# Patient Record
Sex: FEMALE | URBAN - METROPOLITAN AREA
[De-identification: names, ages, dates, MRNs, and addresses within clinical notes are randomized per-mention and may not be internally consistent; named-entity substitution may affect disease eponyms.]

---

## 2017-02-02 ENCOUNTER — TRANSFERRED RECORDS (OUTPATIENT)
Dept: HEALTH INFORMATION MANAGEMENT | Facility: CLINIC | Age: 37
End: 2017-02-02

## 2017-02-03 ENCOUNTER — MEDICAL CORRESPONDENCE (OUTPATIENT)
Dept: HEALTH INFORMATION MANAGEMENT | Facility: CLINIC | Age: 37
End: 2017-02-03

## 2017-02-05 ENCOUNTER — HOSPITAL ENCOUNTER (EMERGENCY)
Facility: CLINIC | Age: 37
Discharge: HOME | End: 2017-02-05
Attending: EMERGENCY MEDICINE | Admitting: EMERGENCY MEDICINE
Payer: COMMERCIAL

## 2017-02-05 VITALS
WEIGHT: 130.6 LBS | HEART RATE: 74 BPM | SYSTOLIC BLOOD PRESSURE: 130 MMHG | RESPIRATION RATE: 16 BRPM | OXYGEN SATURATION: 100 % | DIASTOLIC BLOOD PRESSURE: 74 MMHG | TEMPERATURE: 98.6 F

## 2017-02-05 LAB
ALBUMIN UR-MCNC: NEGATIVE MG/DL
APPEARANCE UR: ABNORMAL
BACTERIA #/AREA URNS HPF: ABNORMAL /HPF
BILIRUB UR QL STRIP: NEGATIVE
COLOR UR AUTO: YELLOW
GLUCOSE UR STRIP-MCNC: NEGATIVE MG/DL
HGB UR QL STRIP: ABNORMAL
KETONES UR STRIP-MCNC: NEGATIVE MG/DL
LEUKOCYTE ESTERASE UR QL STRIP: ABNORMAL
MUCOUS THREADS #/AREA URNS LPF: PRESENT /LPF
NITRATE UR QL: NEGATIVE
PH UR STRIP: 5 PH (ref 5–7)
RBC #/AREA URNS AUTO: 10 /HPF (ref 0–2)
SP GR UR STRIP: 1.01 (ref 1–1.03)
SQUAMOUS #/AREA URNS AUTO: 7 /HPF (ref 0–1)
URN SPEC COLLECT METH UR: ABNORMAL
UROBILINOGEN UR STRIP-MCNC: NORMAL MG/DL (ref 0–2)
WBC #/AREA URNS AUTO: 30 /HPF (ref 0–2)

## 2017-02-05 PROCEDURE — 99284 EMERGENCY DEPT VISIT MOD MDM: CPT | Mod: Z6 | Performed by: EMERGENCY MEDICINE

## 2017-02-05 PROCEDURE — 99283 EMERGENCY DEPT VISIT LOW MDM: CPT | Performed by: EMERGENCY MEDICINE

## 2017-02-05 PROCEDURE — 87086 URINE CULTURE/COLONY COUNT: CPT | Performed by: EMERGENCY MEDICINE

## 2017-02-05 PROCEDURE — 81001 URINALYSIS AUTO W/SCOPE: CPT | Performed by: EMERGENCY MEDICINE

## 2017-02-05 NOTE — ED PROVIDER NOTES
History     Chief Complaint   Patient presents with     Postpartum Complications     abdominal pain, remains of placenta and bad smell     HPI  Franny Jordin Muro is a 36 year old female, who is about 6 weeks postpartum from a normal spontaneous vaginal delivery in late December, presenting to the Emergency Department with persistent vaginal bleeding, abdominal discomfort and some increased foul smelling discharge. The patient was seen at her routine 6 week follow up postpartum visit and was having these symptoms and her Ob/Gyn ordered a transvaginal ultrasound which did show a small portion of retained placenta. She was called and notified of these findings and given return to Emergency Department precautions but was scheduled to visit with the Ob/Gyn again to set her up for outpatient D&C. She notes that since that appointment a few days ago she has had mildly increased pain, has been changing her pads 1-2 times per day due to bleeding and thinks there is an increase in foul smelling discharge. She denies any fevers or chills. No nausea or vomiting. No dysuria. She is otherwise feeling okay.     I have reviewed the Medications, Allergies, Past Medical and Surgical History, and Social History in the Epic system.    History reviewed. No pertinent past medical history.    History reviewed. No pertinent past surgical history.    Family History   Problem Relation Age of Onset     Hyperlipidemia Mother      CANCER Maternal Grandfather      HEART DISEASE Mother      Asthma Son      DIABETES Paternal Aunt        Social History   Substance Use Topics     Smoking status: Never Smoker      Smokeless tobacco: Not on file     Alcohol Use: No     No current facility-administered medications for this encounter.     Current Outpatient Prescriptions   Medication     VITAMIN D, CHOLECALCIFEROL, PO     Prenatal Vit-Fe Fumarate-FA (PRENATAL MULTIVITAMIN  PLUS IRON) 27-0.8 MG TABS per tablet     ibuprofen (ADVIL/MOTRIN) 400 MG  tablet     senna-docusate (SENOKOT-S;PERICOLACE) 8.6-50 MG per tablet     norethindrone (MICRONOR) 0.35 MG per tablet      No Known Allergies    Review of Systems   All other systems negative except as noted in the HPI.    Physical Exam   BP: (!) 126/92 mmHg  Pulse: 74  Heart Rate: 69  Temp: 98.4  F (36.9  C)  Resp: 16  Weight: 59.24 kg (130 lb 9.6 oz)  SpO2: 100 %  Physical Exam   Gen: NAD, sitting on stretcher, conversant and pleasant, non-toxic appearing  HEENT: NCAT, PERRL, EOMI, MMM  Neck: trachea midline, supple with FROM  Cardio: normal rate regular rhythm, no R/M/G, normally perfused and warm  Pulm: steady, non-labored respirations, normal WOB, CTAB, no w/r/r  Abd: soft, minimally tender over suprapubic area, no organomegaly, no CVA tenderness  Pelvic: normal vaginal vault, minimal discharge, no CMT  Ext: normal peripheral pulses, no edema, neurovascularly intact distally.  Skin: no rashes or signs of trauma  Neuro: no focal deficits, 5/5 strength in all ext      ED Course     Procedures               Labs Ordered and Resulted from Time of ED Arrival Up to the Time of Departure from the ED   ROUTINE UA WITH MICROSCOPIC - Abnormal; Notable for the following:     Blood Urine Small (*)     Leukocyte Esterase Urine Large (*)     WBC Urine 30 (*)     RBC Urine 10 (*)     Bacteria Urine Few (*)     Squamous Epithelial /HPF Urine 7 (*)     Mucous Urine Present (*)     All other components within normal limits       Assessments & Plan (with Medical Decision Making)   This is a 36 year old female, 6 weeks postpartum, with retained placenta on ultrasound a few days ago, presenting with increased abdominal pain, discharge and vaginal bleeding, concerning for endometritis. Her exam is very reassuring, however, she has normal vital signs, a soft abdomen with minimal tenderness and her pelvic exam is unremarkable with minimal vaginal bleeding, no discharge and a nontender cervix. After history and physical, an Ob/Gyn  consultation was obtained and they were able to evaluate the patient and do a pelvic exam. They were very reassured by her exam and findings and would like to manage this as an outpatient. They noted that patient hadn t been taking any pain medications and encouraged her to take ibuprofen or Tylenol for the discomfort. They did not appreciate any concerning vaginal bleeding or discharge that they would start antibiotics for and so they recommended that she follow up on Monday with either her own Ob/Gyn or with our Ob/Gyn team to discuss D&C in the coming days. UA likely contaminated given current clinical context, and pt has no dysuria or fevers, low suspicion for UTI so will send for culture. Patient is agreement with plan. Discharged to home at this time in good condition with close follow up with Ob/Gyn for D&C in the next few days.     I have reviewed the nursing notes.    I have reviewed the findings, diagnosis, plan and need for follow up with the patient.    Discharge Medication List as of 2/5/2017 12:10 PM          Final diagnoses:   Retained portions of placenta   I, Geeta Perkins, am serving as a trained medical scribe to document services personally performed by Drew Browning MD, based on the provider's statements to me.      IDrew MD, was physically present and have reviewed and verified the accuracy of this note documented by Geeta Perkins.       2/5/2017   [facility], [place], EMERGENCY DEPARTMENT      Drew Browning MD  02/05/17 3121

## 2017-02-05 NOTE — ED AVS SNAPSHOT
[facility], Emergency Department    [address]    [place] [address]    Phone:  [phone]    Fax:  [phone]                                       Franny Muro   MRN: [id number]    Department:  [facility], Emergency Department   Date of Visit:  2/5/2017           Patient Information     Date Of Birth          1980        Your diagnoses for this visit were:     Retained portions of placenta        You were seen by Drew Browning MD.      Follow-up Information     Follow up with [facility], Emergency Department.    Specialty:  EMERGENCY MEDICINE    Why:  If symptoms worsen    Contact information:    [address]  [place] [address] [phone]    Additional information:    The [facility] is located in the [facility].  is easily accessible from virtually any point in the Health system area, via [address]        Discharge Instructions         Please make an appointment to follow up with OB/Gyn--[facility] (phone: [phone]) as soon as possible.          24 Hour Appointment Hotline       To make an appointment at any [facility] clinic, call [phone] ([phone]). If you don't have a family doctor or clinic, we will help you find one. [facility] clinics are conveniently located to serve the needs of you and your family.             Review of your medicines      Our records show that you are taking the medicines listed below. If these are incorrect, please call your family doctor or clinic.        Dose / Directions Last dose taken    ibuprofen 400 MG tablet   Commonly known as:  ADVIL/MOTRIN   Dose:  400-800 mg   Quantity:  120 tablet        Take 1-2 tablets (400-800 mg) by mouth every 6 hours as needed for other (cramping)   Refills:  0        norethindrone 0.35 MG per tablet   Commonly known as:  MICRONOR   Dose:  1 tablet   Quantity:  84 tablet        Take 1 tablet (0.35 mg) by mouth daily   Refills:  4      "   prenatal multivitamin  plus iron 27-0.8 MG Tabs per tablet   Dose:  1 tablet        Take 1 tablet by mouth daily   Refills:  0        senna-docusate 8.6-50 MG per tablet   Commonly known as:  SENOKOT-S;PERICOLACE   Dose:  1-2 tablet   Quantity:  100 tablet        Take 1-2 tablets by mouth 2 times daily as needed for constipation   Refills:  0        VITAMIN D (CHOLECALCIFEROL) PO        Take by mouth daily   Refills:  0                Procedures and tests performed during your visit     UA with Microscopic      Orders Needing Specimen Collection     None      Pending Results     Date and Time Order Name Status Description    2017 1103 UA with Microscopic In process             Pending Culture Results     Date and Time Order Name Status Description    2017 1103 UA with Microscopic In process             Thank you for choosing [facility]       Thank you for choosing [facility] for your care. Our goal is always to provide you with excellent care. Hearing back from our patients is one way we can continue to improve our services. Please take a few minutes to complete the written survey that you may receive in the mail after you visit with us. Thank you!        [employer] Information     [employer] lets you send messages to your doctor, view your test results, renew your prescriptions, schedule appointments and more. To sign up, go to [email or web address] . Click on \"Log in\" on the left side of the screen, which will take you to the Welcome page. Then click on \"Sign up Now\" on the right side of the page.     You will be asked to enter the access code listed below, as well as some personal information. Please follow the directions to create your username and password.     Your access code is: [email or web address]  Expires: 3/28/2017  9:57 AM     Your access code will  in 90 days. If you need help or a new code, please call your [facility] clinic or [phone].        Care EveryWhere ID     This is your Care EveryWhere " ID. This could be used by other organizations to access your [facility] medical records  [id number]        After Visit Summary       This is your record. Keep this with you and show to your community pharmacist(s) and doctor(s) at your next visit.

## 2017-02-05 NOTE — ED AVS SNAPSHOT
[facility], [place], Emergency Department    [address] [place] [address]    Phone:  [phone]    Fax:  [phone]                                       Franny Muro   MRN: [id number]    Department:  [facility], Emergency Department   Date of Visit:  2/5/2017           After Visit Summary Signature Page     I have received my discharge instructions, and my questions have been answered. I have discussed any challenges I see with this plan with the nurse or doctor.    ..........................................................................................................................................  Patient/Patient Representative Signature      ..........................................................................................................................................  Patient Representative Print Name and Relationship to Patient    ..................................................               ................................................  Date                                            Time    ..........................................................................................................................................  Reviewed by Signature/Title    ...................................................              ..............................................  Date                                                            Time

## 2017-02-05 NOTE — ED NOTES
Pt has had small amount of bleeding changing her pad one to two times daily since 12/26/2016 when she delivered a baby girl. This past Tuesday Franny went in for her 6 week post partum checkup, an ultrasound was performed and she was told that there was some retained placenta.  According to the patient, the Dr. Office told her they would call her for follow-up on Monday or Tuesday however, she has had increasing pain and foul smelling vaginal drainage.

## 2017-02-05 NOTE — CONSULTS
ED Consult Note - Ob/Gyn  Franny Muro   1980  MRN [id number]    CC: retained POCs    HPI: Franny Muro is a 36 year old  who underwent  on 16 following spontaneous labor. She had a first degree laceration which was repaired. Her delivery was complicated by retained placenta requiring manual sweep.   She presented to her OB provider at [facility] for follow up on 1/10/17 for vaginal odor and was found to have a negative wet prep and a UA concerning for UTI, for which she was treated with Macrobid. She then saw her provider on 17 for postpartum visit. She reports vaginal bleeding for the past two weeks causing her to change a pad 2-3 times/day. She also endorses intermittent right sided tenderness and cramping for the pas tweeks. She had an ultrasound performed demonstrating retained placenta per the notes. However, we are unable to view the images or ultrasound report through Care Everywhere today.     The patient presented to the ED this morning for worsening pain, foul smelling vaginal discharge and bleeding. She has been bleeding requiring 1-2 pads per day. She also reports foul smelling vaginal discharge but has not noticed any purulent green or yellow discharge and denies fevers or chills at home. She has cramping in her lower abdomen and describes it as warmth. She also endorses dizziness at home. Has not tried any medications at home for cramps. She denies nausea or vomiting and had a large glass of milk this morning at 8:30. Reports no issues with urination or bowel movements. She is breastfeeding and supplementing.     10 point ROS negative other than listed above.    PMH:  None    PSH:  None        No current facility-administered medications on file prior to encounter.  Current Outpatient Prescriptions on File Prior to Encounter:  VITAMIN D, CHOLECALCIFEROL, PO Take by mouth daily   Prenatal Vit-Fe Fumarate-FA (PRENATAL MULTIVITAMIN  PLUS IRON)  "27-0.8 MG TABS per tablet Take 1 tablet by mouth daily   ibuprofen (ADVIL/MOTRIN) 400 MG tablet Take 1-2 tablets (400-800 mg) by mouth every 6 hours as needed for other (cramping)   senna-docusate (SENOKOT-S;PERICOLACE) 8.6-50 MG per tablet Take 1-2 tablets by mouth 2 times daily as needed for constipation   norethindrone (MICRONOR) 0.35 MG per tablet Take 1 tablet (0.35 mg) by mouth daily         No Known Allergies     Social History     Social History     Marital Status:      Spouse Name: N/A     Number of Children: N/A     Years of Education: N/A     Occupational History     Not on file.     Social History Main Topics     Smoking status: Never Smoker      Smokeless tobacco: Not on file     Alcohol Use: No     Drug Use: No     Sexual Activity: Not on file     Other Topics Concern     Not on file     Social History Narrative        Objective:  Temp: 98.6  F (37  C) Temp src: Oral BP: 130/74 mmHg Pulse: 74 Heart Rate: 69 Resp: 16 SpO2: 100 % O2 Device: None (Room air)      Gen: NAD, sitting comfortably, pleasant  Heart: RRR  Lungs: CTAB  Abd: soft, nondistended, minimally tender to palpation in suprapubic area and RLQ   : external genitalia within normal limits.  Speculum exam reveals normal, closed, multiparous cervix with minimal thin yellow/white discharge. No cervical lesions or masses. No bleeding. Bimanual exam reveals no cervical motion tenderness, minimal tenderness in right adnexa. No fundal tenderness. Uterus small, mobile and anteverted.     Labs/Imaging:  Ultrasound at [facility] 2/3/17: \"official US report confirms retained placenta.\"    O positive    Assessment/Plan: Franny Muro is a 36 year old  who is approximately 6 weeks postpartum s/p  and manual sweep for retained placenta, now with retained products of conception. No evidence of hemodynamic instability, hemorrhage or infection. Patient stable for outpatient management.   - We discussed that patient has known " retained products of conception (although unable to view ultrasound report or images). She was already referred to GYN for retained POCs but has not yet been able to make an appointment to schedule a D&C. She states that she came to the ED because she was worried about it. She has no evidence of hemorrhage or acute infection indicating emergent dilation and curettage today. However, she should follow up in clinic within the next 1-2 days for preoperative visit to get D&C scheduled. Discussed that it would likely be under ultrasound guidance to ensure evacuation of all tissue within uterus.   - Patient to follow up in GYN clinic Monday or Tuesday this week. We will be able to obtain ultrasound records and confirm retained POCs and get her scheduled for D&C. Discussed bleeding and infectious precautions and when to call or come back to the ED. Patient and family understand and are in agreement with the plan.     Patient staffed with Dr. Estrada.    Emily Chapman MD  OB/GYN PGY3  02/05/2017

## 2017-02-05 NOTE — DISCHARGE INSTRUCTIONS
Please make an appointment to follow up with OB/Gyn--[facility] (phone: [phone]) as soon as possible.

## 2017-02-06 LAB
BACTERIA SPEC CULT: NORMAL
Lab: NORMAL
MICRO REPORT STATUS: NORMAL
SPECIMEN SOURCE: NORMAL

## 2017-02-07 ENCOUNTER — OFFICE VISIT (OUTPATIENT)
Dept: OBGYN | Facility: CLINIC | Age: 37
End: 2017-02-07
Attending: OBSTETRICS & GYNECOLOGY
Payer: COMMERCIAL

## 2017-02-07 VITALS — WEIGHT: 130.7 LBS | SYSTOLIC BLOOD PRESSURE: 112 MMHG | DIASTOLIC BLOOD PRESSURE: 72 MMHG | HEART RATE: 68 BPM

## 2017-02-07 PROCEDURE — 88305 TISSUE EXAM BY PATHOLOGIST: CPT | Performed by: OBSTETRICS & GYNECOLOGY

## 2017-02-07 PROCEDURE — 99212 OFFICE O/P EST SF 10 MIN: CPT | Mod: ZF

## 2017-02-07 PROCEDURE — 59160 D & C AFTER DELIVERY: CPT

## 2017-02-07 RX ORDER — DOXYCYCLINE 100 MG/1
200 CAPSULE ORAL ONCE
Qty: 2 CAPSULE | Refills: 0 | Status: SHIPPED | OUTPATIENT
Start: 2017-02-07 | End: 2017-02-07

## 2017-02-07 ASSESSMENT — PATIENT HEALTH QUESTIONNAIRE - PHQ9: 5. POOR APPETITE OR OVEREATING: NOT AT ALL

## 2017-02-07 ASSESSMENT — ANXIETY QUESTIONNAIRES
5. BEING SO RESTLESS THAT IT IS HARD TO SIT STILL: NOT AT ALL
7. FEELING AFRAID AS IF SOMETHING AWFUL MIGHT HAPPEN: NOT AT ALL
1. FEELING NERVOUS, ANXIOUS, OR ON EDGE: NOT AT ALL
6. BECOMING EASILY ANNOYED OR IRRITABLE: NOT AT ALL
3. WORRYING TOO MUCH ABOUT DIFFERENT THINGS: NOT AT ALL
GAD7 TOTAL SCORE: 0
2. NOT BEING ABLE TO STOP OR CONTROL WORRYING: NOT AT ALL

## 2017-02-07 NOTE — NURSING NOTE
Patient status post D&C procedure, complains of right sided lower abdominal pain and this is worsened when she tries to move her right leg. Vaginal bleeding minimal. Blood pressure 130/84 pulse 58. Ibuprofen 800 mg Po given to patient at approximately 1:00 by Dr. Mccormack along with 975 mg Tylenol at 1:20. Patient's  here to offer support. Tricia ULLOA

## 2017-02-07 NOTE — Clinical Note
"2017       RE: Franny Muro  [address]  [place] [address]     Dear Colleague,    Thank you for referring your patient, Franny Muro, to the [facility] at [facility]. Please see a copy of my visit note below.    PROCEDURE NOTE    37 yo  s/p  16 w/ retained placenta requiring manual extraction. Has been bleeding since delivery daily. Not heavy. No fevers.  Had us/ showing likely retained POC w/ 2 cm globular area at fundus.     Pt here for recs.      We discussed options for OR d and c vs office d and c w/ local anesthetic. Pt very tearful when she thought she may not get to have her procedure today. Highly desires treatment today.     Consent obtained w/ help of .      Suction d and c    Time Out - \"Pause for the Cause\"  Just before the procedure begins, through verbal and active participation of team members, verify:    Initials   Patient Name    smd   Patient Date of Birth smd   Procedure to be performed  smd   Site, laterality, level or multiples, noting patient position   smd   Relevant images or diagnostics available/displayed   smd   Implants, special equipment or special requirements        smd     Consent:  Risks, benefits of treatment, and no treatment were discussed.  Patient's questions were elicited and answered.  and Written consent signed and scanned into medical record.    Preoperative Diagnosis: retained POC  Postoperative Diagnosis:  Same    Surgeon: Dr Mccormack  Assist: Dr Solis    Skin Preparation:  Betadine  Anesthesia: 20 mL 1% lidocaine without EPI   Technique:  Using sterile technique, cervix was anesthetized.  The block was placed.  Tenaculum was applied at 12 o'clock. Tthe cervix was dilated to 21 [place].  A manual suction curette was used for removal of retained tissue.   Two passes were taken for small amount of tissue.  All instruments were removed. " Hemostasis was assured.   EBL:  10 mL  Complications:  none  Total Time:  20 min  Pathology:  Retained placenta  Tolerance of Procedure: Patient did tolerate the procedure well.  F/u: u/s here in 2 weeks and an rx for Doxycycline 200 Mg x 1.     Nuha Solis MD, FACOG  [facility] Staff  OB/GYN    2/7/2017  12:44 PM

## 2017-02-07 NOTE — PATIENT INSTRUCTIONS
Miscarriage Management with a D&C Aftercare Instructions    Today you had a dilation and curettage (D&C).  Like many other surgical procedures, a D&C is very safe but has small risks of side effects and complications. Although complications are rare, it is important that you know what to expect and what to do. Please keep this instruction sheet so that you may use it as a reference.    If you have any problems or questions, you can contact a doctor in our group at any time at [phone]. If you cannot reach a doctor and it is an emergency, you can call the [facility] ([facility]) Emergency Department at [phone].    WHAT TO EXPECT:    Bleeding:  Excessive bleeding is very uncommon. The normal amount of bleeding will vary from woman to woman. Some may have very little bleeding or no bleeding at all. Most commonly, women begin bleeding the day of procedure and may bleed for about one week. Some women can have spotting for as long as 2-4 weeks. It is normal to pass small clots and sometimes the bleeding may seem to increase when you get up suddenly or go to the toilet.     Cramping:  You will probably experience cramping for a few days that is similar to cramping with a menstrual period. Cramping is often caused by the uterus shrinking back to a normal size.  You should be able to obtain relief by taking ibuprofen (Motrin or Advil); take 600 mg (3 of the over-the-counter tablets) every 6 hours as needed for pain.  You can also use a heating pad or hot water bottle if necessary. You may use 2 Extra Strength Tylenol every 8 hours as needed if you are allergic to ibuprofen or aspirin, or in addition to ibuprofen if your pain is not relieved by ibuprofen alone. If the cramping is severe or prolonged, and you are not getting relief from any of these methods, please call the office.    Coping after your procedure:  Women have many different feelings and emotions after a miscarriage. It  is not unusual for some women to feel  down.   If you feel your emotions are not what they should be and/or need additional emotional help after your miscarriage, please contact our office or your primary doctor.    Pad/Tampon Use:  Pads should be used for the first few days after your procedure. The rate of bleeding can be observed more easily when pads are used. Tampons can be used once the bleeding has slowed down and you are only spotting.      Activity:  You may resume normal activity, including a normal diet, immediately. Sometimes, with strenuous activity (like heavy lifting and bending), your bleeding may increase. This increase does not happen for everyone. If you notice that your bleeding increases with strenuous activity, then try avoid these activities for two days.    Bathing:  You may bathe in a shower or tub at any time.  Do not douche.     Sexual intercourse:  You should not have intercourse for one week after your procedure.    Return of your period:  If you are not using hormonal birth control, you can expect a period in 4-8 weeks.     Pregnancy symptoms:  If your breasts were sore or you had a lot of nausea before the D&C, these symptoms usually go away within 1 to 2 days.    WARNING SIGNS:    Heavy bleeding:  If you are soaking through more than 2 maxi pads an hour for more than 2 hours.    Severe cramps:  Cramps that are getting stronger and are not helped by pain medication.    Fever:  You should take your temperature with a thermometer if you feel warm, have chills, or feel ill. If your temperature is 100.4 F or higher for two times in a row (taken roughly four hours apart), or 101 F or higher even once, call the office. The fever may indicate infection and may need treatment.

## 2017-02-07 NOTE — NURSING NOTE
Patient states pain is getting a little better. She rates it on pain scale of 6. Vaginal bleeding small amount. Blood pressure 125/77, pulse 62, Temperature 98.1.

## 2017-02-07 NOTE — NURSING NOTE
Patient verbalized need to urinate. States her pain has improved to about 2-3. Ambulated to bathroom emptied urine without difficulty. Bleeding light to medium Patient instructions reviewed with her and her . Reviewed that she should take the antibiotic with next meal and to call to make an appointment in two weeks time. They verbalized understanding, escorted to the elevator, denies dizziness or light headed

## 2017-02-07 NOTE — PROGRESS NOTES
"PROCEDURE NOTE    37 yo  s/p  16 w/ retained placenta requiring manual extraction. Has been bleeding since delivery daily. Not heavy. No fevers.  Had us/ showing likely retained POC w/ 2 cm globular area at fundus.     Pt here for recs.      We discussed options for OR d and c vs office d and c w/ local anesthetic. Pt very tearful when she thought she may not get to have her procedure today. Highly desires treatment today.     Consent obtained w/ help of .      Suction d and c    Time Out - \"Pause for the Cause\"  Just before the procedure begins, through verbal and active participation of team members, verify:    Initials   Patient Name    smd   Patient Date of Birth smd   Procedure to be performed  smd   Site, laterality, level or multiples, noting patient position   smd   Relevant images or diagnostics available/displayed   smd   Implants, special equipment or special requirements        smd     Consent:  Risks, benefits of treatment, and no treatment were discussed.  Patient's questions were elicited and answered.  and Written consent signed and scanned into medical record.    Preoperative Diagnosis: retained POC  Postoperative Diagnosis:  Same    Surgeon: Dr Mccormack  Assist: Dr Solis    Skin Preparation:  Betadine  Anesthesia: 20 mL 1% lidocaine without EPI   Technique:  Using sterile technique, cervix was anesthetized.  The block was placed.  Tenaculum was applied at 12 o'clock. Tthe cervix was dilated to 21 [place].  A manual suction curette was used for removal of retained tissue.   Two passes were taken for small amount of tissue.  All instruments were removed. Hemostasis was assured.   EBL:  10 mL  Complications:  none  Total Time:  20 min  Pathology:  Retained placenta  Tolerance of Procedure: Patient did tolerate the procedure well.  F/u: u/s here in 2 weeks and an rx for Doxycycline 200 Mg x 1.     I performed the procedure.  Ruth Mccormack MD MPH    I attest that no " "qualified resident or fellow was available to assist for this surgery because (select one of the following):      on the day of surgery there were no qualified surgical residents or fellows available due to clinic and OR schedule. Circumstances required the skills of Dr. Solis to assist with the patient's D&C procedure.\"           "

## 2017-02-07 NOTE — NURSING NOTE
Chief Complaint   Patient presents with     Consult For     D&C consult       Jackie Mckee, [facility] 2/7/2017

## 2017-02-08 ASSESSMENT — ANXIETY QUESTIONNAIRES: GAD7 TOTAL SCORE: 0

## 2017-02-08 ASSESSMENT — PATIENT HEALTH QUESTIONNAIRE - PHQ9: SUM OF ALL RESPONSES TO PHQ QUESTIONS 1-9: 0

## 2017-02-10 LAB — COPATH REPORT: NORMAL

## 2017-02-27 ENCOUNTER — TELEPHONE (OUTPATIENT)
Dept: OBGYN | Facility: CLINIC | Age: 37
End: 2017-02-27

## 2017-02-27 ENCOUNTER — OFFICE VISIT (OUTPATIENT)
Dept: OBGYN | Facility: CLINIC | Age: 37
End: 2017-02-27
Attending: OBSTETRICS & GYNECOLOGY
Payer: COMMERCIAL

## 2017-02-27 PROCEDURE — 76830 TRANSVAGINAL US NON-OB: CPT | Mod: ZF

## 2017-02-27 NOTE — MR AVS SNAPSHOT
After Visit Summary   2017    Franny Muro    MRN: [id number]           Patient Information     Date Of Birth          1980        Visit Information        Provider Department      2017 10:15 AM [employer]; [facility] ULTRASOUND [facility]        Today's Diagnoses     Retained placenta or membranes           Follow-ups after your visit        Who to contact     Please call your clinic at [phone] to:    Ask questions about your health    Make or cancel appointments    Discuss your medicines    Learn about your test results    Speak to your doctor   If you have compliments or concerns about an experience at your clinic, or if you wish to file a complaint, please contact [facility] Patient Relations at [phone] or email us at [email or web address]         Additional Information About Your Visit        MyChart Information     [employer] is an electronic gateway that provides easy, online access to your medical records. With [employer], you can request a clinic appointment, read your test results, renew a prescription or communicate with your care team.     To sign up for [employer] visit the website at [email or web address]   You will be asked to enter the access code listed below, as well as some personal information. Please follow the directions to create your username and password.     Your access code is: [email or web address]  Expires: 3/28/2017  9:57 AM     Your access code will  in 90 days. If you need help or a new code, please contact your [facility] or call [phone] for assistance.        Care EveryWhere ID     This is your Care EveryWhere ID. This could be used by other organizations to access your [place] medical records  [id number]         Blood Pressure from Last 3 Encounters:   17 112/72   17 130/74   16 112/70    Weight from Last 3  Encounters:   02/07/17 59.3 kg (130 lb 11.2 oz)   02/05/17 59.2 kg (130 lb 9.6 oz)              We Performed the Following     US GYN Complete Transvaginal - 34948 (In Clinic)        Primary Care Provider Office Phone # Fax #    [facility] [phone]       [address] [place] [address]        Thank you!     Thank you for choosing [facility]  for your care. Our goal is always to provide you with excellent care. Hearing back from our patients is one way we can continue to improve our services. Please take a few minutes to complete the written survey that you may receive in the mail after your visit with us. Thank you!             Your Updated Medication List - Protect others around you: Learn how to safely use, store and throw away your medicines at www.disposemymeds.org.          This list is accurate as of: 2/27/17  5:41 PM.  Always use your most recent med list.                   Brand Name Dispense Instructions for use    prenatal multivitamin  plus iron 27-0.8 MG Tabs per tablet      Take 1 tablet by mouth daily       VITAMIN D (CHOLECALCIFEROL) PO      Take by mouth daily

## 2017-02-27 NOTE — LETTER
2/27/2017       RE: Franny Muro  [address]  [facility] [address]     Dear Colleague,    Thank you for referring your patient, Franny Muro, to the [facility] at [facility]. Please see a copy of my visit note below.    36 year old female presents for gynecologic ultrasound indicated by ?retained placenta or membranes.  This study was done transvaginally.    Uterine findings:   Presence: Visible Size: Normal 3.7 x 5.1 x 4.7 cm.  Endometrium = 8.1 mm.- thickened and irregular.   Cx length = 2.8 mm.      Flexion:  Retroverted Position: Deviated right Margins: Smooth Shape: Normal   Contour: Regular Texture: Homogeneous Cavity: Abnormal Masses: Normal    Pelvic findings:    Right Adnexa: Normal   Left Adnexa: Normal   Bladder:  Normal         Cul - de - sac fluid: None    Ovarian follicles:   Right ovary:  2.6 x 2.4 x 2.4cm.     0 follicles     Left ovary:  2.4 x 3.0 x 1.2cm.     ? Para ovarian cyst vs follicle-1.4 x 1.2 x 1.2cm      Comments:  Concern for retained products of conception.     DAGO Cleveland MD

## 2017-02-27 NOTE — TELEPHONE ENCOUNTER
Pt in clinic today for repeat US after office D&C procedure for retained POC 2/7/17. US today showed still come retained products. Pt has had the same symptoms since before D&C which consists of pain off and on in her right lower abdomen rated 5/10 and tolerable, she is not taking medication for this. She denies severe pain fevers, abnormal vaginal discharge, or bleeding.    Discussed with on call MD who states she will call patient at home. Discussed this with pt and advised MD will call her tonight to discuss plan. Pt was very teary and upset in clinic room today, reassured she has done nothing wrong. She understood and had no further questions.

## 2017-02-27 NOTE — PROGRESS NOTES
36 year old female presents for gynecologic ultrasound indicated by ?retained placenta or membranes.  This study was done transvaginally.    Uterine findings:   Presence: Visible Size: Normal 3.7 x 5.1 x 4.7 cm.  Endometrium = 8.1 mm.- thickened and irregular.   Cx length = 2.8 mm.      Flexion:  Retroverted Position: Deviated right Margins: Smooth Shape: Normal   Contour: Regular Texture: Homogeneous Cavity: Abnormal Masses: Normal    Pelvic findings:    Right Adnexa: Normal   Left Adnexa: Normal   Bladder:  Normal         Cul - de - sac fluid: None    Ovarian follicles:   Right ovary:  2.6 x 2.4 x 2.4cm.     0 follicles     Left ovary:  2.4 x 3.0 x 1.2cm.     ? Para ovarian cyst vs follicle-1.4 x 1.2 x 1.2cm      Comments:  Concern for retained products of conception.     DAGO Cleveland MD

## 2017-02-28 ENCOUNTER — TELEPHONE (OUTPATIENT)
Dept: OBGYN | Facility: CLINIC | Age: 37
End: 2017-02-28

## 2017-02-28 NOTE — TELEPHONE ENCOUNTER
Spoke with Dr. Zavaleta in clinic. She was unable to speak with patient over the phone yesterday. Dr. Mccormack would like to see patient in clinic to explain etiology and treatment.    Called and left message with  for Franny to call back to see if she can come to clinic today or sometime soon.

## 2017-03-01 ENCOUNTER — OFFICE VISIT (OUTPATIENT)
Dept: OBGYN | Facility: CLINIC | Age: 37
End: 2017-03-01
Attending: OBSTETRICS & GYNECOLOGY
Payer: COMMERCIAL

## 2017-03-01 ENCOUNTER — TELEPHONE (OUTPATIENT)
Dept: OBGYN | Facility: CLINIC | Age: 37
End: 2017-03-01

## 2017-03-01 VITALS — HEART RATE: 67 BPM | DIASTOLIC BLOOD PRESSURE: 63 MMHG | WEIGHT: 131.5 LBS | SYSTOLIC BLOOD PRESSURE: 115 MMHG

## 2017-03-01 PROCEDURE — 99212 OFFICE O/P EST SF 10 MIN: CPT | Mod: ZF

## 2017-03-01 RX ORDER — MISOPROSTOL 200 UG/1
600 TABLET ORAL ONCE
Qty: 3 TABLET | Refills: 0 | Status: SHIPPED
Start: 2017-03-01 | End: 2017-03-01

## 2017-03-01 RX ORDER — METHYLERGONOVINE MALEATE 0.2 MG/1
0.2 TABLET ORAL EVERY 8 HOURS
Qty: 3 TABLET | Refills: 0 | Status: SHIPPED
Start: 2017-03-01 | End: 2017-03-13

## 2017-03-01 NOTE — MR AVS SNAPSHOT
After Visit Summary   3/1/2017    Franny Muro    MRN: [id number]           Patient Information     Date Of Birth          1980        Visit Information        Provider Department      3/1/2017 2:30 PM Geeta Espana; Mónica Abbott MD [facility]        Today's Diagnoses     Retained products of conception after delivery without hemorrhage    -  1      Care Instructions    - Schedule US for early next week        Follow-ups after your visit        Your next 10 appointments already scheduled     Mar 07, 2017 10:30 AM CST   ULTRASOUND with [facility] ULTRASOUND   [facility] ([facility])    [facility] [address]  [address]  [facility] [id number] [phone]              Future tests that were ordered for you today     Open Future Orders        Priority Expected Expires Ordered    US GYN Complete Transvaginal - 50239 (In Clinic) Routine  6/29/2017 3/1/2017            Who to contact     Please call your clinic at [phone] to:    Ask questions about your health    Make or cancel appointments    Discuss your medicines    Learn about your test results    Speak to your doctor   If you have compliments or concerns about an experience at your clinic, or if you wish to file a complaint, please contact [facility] Patient Relations at [phone] or email us at [email or web address]         Additional Information About Your Visit        MyChart Information     [employer] is an electronic gateway that provides easy, online access to your medical records. With [employer], you can request a clinic appointment, read your test results, renew a prescription or communicate with your care team.     To sign up for [employer] visit the website at [email or web address]   You will be asked to enter the access code listed below, as well as some personal information. Please follow the  directions to create your username and password.     Your access code is: [email or web address]  Expires: 3/28/2017  9:57 AM     Your access code will  in 90 days. If you need help or a new code, please contact your [facility] or call [phone] for assistance.        Care EveryWhere ID     This is your Care EveryWhere ID. This could be used by other organizations to access your [place] medical records  [id number]        Your Vitals Were     Pulse                   67            Blood Pressure from Last 3 Encounters:   17 115/63   17 112/72   17 130/74    Weight from Last 3 Encounters:   17 59.6 kg (131 lb 8 oz)   17 59.3 kg (130 lb 11.2 oz)   17 59.2 kg (130 lb 9.6 oz)                 Today's Medication Changes          These changes are accurate as of: 3/1/17  3:45 PM.  If you have any questions, ask your nurse or doctor.               Start taking these medicines.        Dose/Directions    methylergonovine 0.2 MG tablet   Commonly known as:  METHERGINE   Used for:  Retained products of conception after delivery without hemorrhage   Started by:  Mónica Abbott MD        Dose:  0.2 mg   Take 1 tablet (200 mcg) by mouth every 8 hours   Quantity:  3 tablet   Refills:  0       misoprostol 200 MCG tablet   Commonly known as:  CYTOTEC   Used for:  Retained products of conception after delivery without hemorrhage   Started by:  Mónica Abbott MD        Dose:  600 mcg   Place 3 tablets (600 mcg) inside cheek once for 1 dose Place 3 tab between cheek and gum. Dissolve for 30 min, then swallow.   Quantity:  3 tablet   Refills:  0            Where to get your medicines      These medications were sent to [facility] [address] [address] & [address] [address]     Phone:  [phone]     methylergonovine 0.2 MG tablet    misoprostol 200 MCG tablet                Primary Care  Provider Office Phone # Fax #    [facility] [phone]       [address] [place] [address]        Thank you!     Thank you for choosing [facility]  for your care. Our goal is always to provide you with excellent care. Hearing back from our patients is one way we can continue to improve our services. Please take a few minutes to complete the written survey that you may receive in the mail after your visit with us. Thank you!             Your Updated Medication List - Protect others around you: Learn how to safely use, store and throw away your medicines at www.disposemymeds.org.          This list is accurate as of: 3/1/17  3:45 PM.  Always use your most recent med list.                   Brand Name Dispense Instructions for use    methylergonovine 0.2 MG tablet    METHERGINE    3 tablet    Take 1 tablet (200 mcg) by mouth every 8 hours       misoprostol 200 MCG tablet    CYTOTEC    3 tablet    Place 3 tablets (600 mcg) inside cheek once for 1 dose Place 3 tab between cheek and gum. Dissolve for 30 min, then swallow.       prenatal multivitamin  plus iron 27-0.8 MG Tabs per tablet      Take 1 tablet by mouth daily       VITAMIN D (CHOLECALCIFEROL) PO      Take by mouth daily

## 2017-03-01 NOTE — TELEPHONE ENCOUNTER
Spoke with Franny with . She is able to come in today to see MD to discuss remained products and how to treat. Appointment made for 2:45 this afternoon.

## 2017-03-01 NOTE — LETTER
3/1/2017       RE: Franny Muro  [address]  [facility] [address]     Dear Colleague,    Thank you for referring your patient, Franny Muro, to the [facility] at [facility]. Please see a copy of my visit note below.    [facility] Visit    CC: Follow up US results    S: Franny Muro is a 36 year old  who presents to clinic today to discuss results from recent US.    Briefly, Franny had a  on 14 which was complicated by a retained placenta requiring a manual extraction.  On  she presented for evaluation of low abdominal pain and vaginal bleeding.  An US was performed showing retained products of conception.  In 17 she underwent a D+C in clinic which was uncomplicated.  Pathology returned showing some chorionic villi and decidua.  On 17 she had a follow up US performed which showed that she continues to have retained products of conception present.    Franny has been feeling well overall.  She is frustrated by her postpartum course and wonders why there is still tissue left in her uterus after having a D+C.  She denies having any vaginal bleeding.  She is still experiencing occasional cramping and frequent low back pain.     O: /63  Pulse 67  Wt 59.6 kg (131 lb 8 oz)  General: No distress  Resp: Breathing comfortably on room air     pelvic US  36 year old female presents for gynecologic ultrasound indicated by ?retained placenta or membranes.  This study was done transvaginally.     Uterine findings:  Presence: Visible Size: Normal 3.7 x 5.1 x 4.7 cm. Endometrium = 8.1 mm.- thickened and irregular.  Cx length = 2.8 mm.      Flexion: Retroverted Position: Deviated right Margins: Smooth Shape: Normal  Contour: Regular Texture: Homogeneous Cavity: Abnormal Masses: Normal     Pelvic findings:   Right Adnexa: Normal  Left Adnexa: Normal  Bladder: Normal        Cul - de - sac fluid: None     Ovarian follicles:  Right ovary: 2.6 x 2.4 x 2.4cm.     0 follicles     Left ovary: 2.4 x 3.0 x 1.2cm.     ? Para ovarian cyst vs follicle-1.4 x 1.2 x 1.2cm     Comments: Concern for retained products of conception.      A/P: Franny Muro is a 36 year old  with likely retained products of conception despite undergoing a D+C on 2/7/16.    #Reatined POCs  - Discussed treatment options including medical management versus surgical management  - After having all her questions answered, she elected to proceed with medical management. She received a Rx for buccal misoprostol and po methergine x24 hours.  - Discussed warning signs and reasons to return for further evaluation  - She will schedule a follow up US to be performed on 3/7  - Discussed that if medical management fails, we would recommend operative hysteroscopy in addition to a D+C    Mónica Abbott MD  OBGYN PGY4

## 2017-03-02 NOTE — PROGRESS NOTES
[facility] Visit    CC: Follow up US results    S: Franny Muro is a 36 year old  who presents to clinic today to discuss results from recent US.    Briefly, Franny had a  on 16 which was complicated by a retained placenta requiring a manual extraction.  On  she presented for evaluation of low abdominal pain and vaginal bleeding.  An US was performed showing retained products of conception.  In 17 she underwent a D+C in clinic which was uncomplicated.  Pathology returned showing some chorionic villi and decidua.  On 17 she had a follow up US performed which showed that she continues to have retained products of conception present.    Franny has been feeling well overall.  She is frustrated by her postpartum course and wonders why there is still tissue left in her uterus after having a D+C.  She denies having any vaginal bleeding.  She is still experiencing occasional cramping and frequent low back pain.     O: /63  Pulse 67  Wt 59.6 kg (131 lb 8 oz)  General: No distress  Resp: Breathing comfortably on room air     pelvic US  36 year old female presents for gynecologic ultrasound indicated by ?retained placenta or membranes.  This study was done transvaginally.     Uterine findings:  Presence: Visible Size: Normal 3.7 x 5.1 x 4.7 cm. Endometrium = 8.1 mm.- thickened and irregular.  Cx length = 2.8 mm.      Flexion: Retroverted Position: Deviated right Margins: Smooth Shape: Normal  Contour: Regular Texture: Homogeneous Cavity: Abnormal Masses: Normal     Pelvic findings:   Right Adnexa: Normal  Left Adnexa: Normal  Bladder: Normal       Cul - de - sac fluid: None     Ovarian follicles:  Right ovary: 2.6 x 2.4 x 2.4cm.     0 follicles     Left ovary: 2.4 x 3.0 x 1.2cm.     ? Para ovarian cyst vs follicle-1.4 x 1.2 x 1.2cm     Comments: Concern for retained products of conception.      A/P: Franny Muro is a 36 year old  with likely  retained products of conception despite undergoing a D+C on 2/7/16.    #Reatined POCs  - Discussed treatment options including medical management versus surgical management  - After having all her questions answered, she elected to proceed with medical management. She received a Rx for buccal misoprostol and po methergine x24 hours.  - Discussed warning signs and reasons to return for further evaluation  - She will schedule a follow up US to be performed on 3/7  - Discussed that if medical management fails, we would recommend operative hysteroscopy in addition to a D+C    Mónica Abbott MD  OBGYN PGY4    I agree with note as above.  Assessment and plan were jointly made.  Emiliana Zacarias MD

## 2017-03-07 ENCOUNTER — OFFICE VISIT (OUTPATIENT)
Dept: OBGYN | Facility: CLINIC | Age: 37
End: 2017-03-07
Attending: OBSTETRICS & GYNECOLOGY
Payer: COMMERCIAL

## 2017-03-07 PROCEDURE — 76857 US EXAM PELVIC LIMITED: CPT | Mod: ZF

## 2017-03-07 RX ORDER — DOXYCYCLINE 100 MG/10ML
100 INJECTION, POWDER, LYOPHILIZED, FOR SOLUTION INTRAVENOUS
Status: CANCELLED | OUTPATIENT
Start: 2017-03-07

## 2017-03-07 NOTE — LETTER
3/7/2017       RE: Franny Muro  [address]  [facility] [address]     Dear Colleague,    Thank you for referring your patient, Franny Muro, to the [facility] at [facility]. Please see a copy of my visit note below.    36 year old female with presents for gynecologic ultrasound indicated by ? Retained placenta or membranes.  This study was done transvaginally.    Uterine findings:   Presence: Visible Size: Normal 5.0 x 5.1 x 4.8 cm.  Endometrium = 10.9 mm- irregular and thickened   Cx length = 1.9 mm.      Flexion:  Midposition Position: Midline Margins: Smooth Shape: Normal   Contour: Regular Texture: Homogeneous Cavity: Abnormal Masses: Normal    Pelvic findings:    Right Adnexa: Normal   Left Adnexa: Normal   Bladder:  Normal         Cul - de - sac fluid: None    Right ovary:  Appeared normal     Left ovary: not visualized        Comments:  Thickened endometrium despite prior medication and manual vacuum extraction. Recommend OR for d and c.  This was communicated to the patient via  over the phone.  Pt expresses understanding of explanation.      DAGO Cleveland MD, FACOG  [facility]  OB/GYN    3/7/2017  2:53 PM    Again, thank you for allowing me to participate in the care of your patient.      Sincerely,    John Levi

## 2017-03-07 NOTE — MR AVS SNAPSHOT
After Visit Summary   3/7/2017    Franny Muro    MRN: [id number]           Patient Information     Date Of Birth          1980        Visit Information        Provider Department      3/7/2017 10:15 AM [employer]; [facility]TR[facility]        Today's Diagnoses     Retained products of conception after delivery without hemorrhage    -  1       Follow-ups after your visit        Who to contact     Please call your clinic at [phone] to:    Ask questions about your health    Make or cancel appointments    Discuss your medicines    Learn about your test results    Speak to your doctor   If you have compliments or concerns about an experience at your clinic, or if you wish to file a complaint, please contact [facility] Patient Relations at [phone] or email us at [email or web address]         Additional Information About Your Visit        MyChart Information     Flavourst is an electronic gateway that provides easy, online access to your medical records. With Zuu Onlnine, you can request a clinic appointment, read your test results, renew a prescription or communicate with your care team.     To sign up for Flavourst visit the website at [email or web address]   You will be asked to enter the access code listed below, as well as some personal information. Please follow the directions to create your username and password.     Your access code is: [email or web address]  Expires: 3/28/2017  9:57 AM     Your access code will  in 90 days. If you need help or a new code, please contact your [facility] or call [phone] for assistance.        Care EveryWhere ID     This is your Care EveryWhere ID. This could be used by other organizations to access your [facility] medical records  [id number]         Blood Pressure from Last 3 Encounters:   17 115/63   17 112/72    02/05/17 130/74    Weight from Last 3 Encounters:   03/01/17 59.6 kg (131 lb 8 oz)   02/07/17 59.3 kg (130 lb 11.2 oz)   02/05/17 59.2 kg (130 lb 9.6 oz)              We Performed the Following     Gyn,Limited (Follow up US) (In Clinic) 00104     HCG quantitative pregnancy     Molly-Operative Worksheet (Dilation and Curettage)        Primary Care Provider Office Phone # Fax #    [facility] [place] [phone]       [address] [place] [address]        Thank you!     Thank you for choosing [facility]  for your care. Our goal is always to provide you with excellent care. Hearing back from our patients is one way we can continue to improve our services. Please take a few minutes to complete the written survey that you may receive in the mail after your visit with us. Thank you!             Your Updated Medication List - Protect others around you: Learn how to safely use, store and throw away your medicines at www.disposemymeds.org.          This list is accurate as of: 3/7/17  3:01 PM.  Always use your most recent med list.                   Brand Name Dispense Instructions for use    methylergonovine 0.2 MG tablet    METHERGINE    3 tablet    Take 1 tablet (200 mcg) by mouth every 8 hours       prenatal multivitamin  plus iron 27-0.8 MG Tabs per tablet      Take 1 tablet by mouth daily       VITAMIN D (CHOLECALCIFEROL) PO      Take by mouth daily

## 2017-03-07 NOTE — PROGRESS NOTES
36 year old female with presents for gynecologic ultrasound indicated by ? Retained placenta or membranes.  This study was done transvaginally.    Uterine findings:   Presence: Visible Size: Normal 5.0 x 5.1 x 4.8 cm.  Endometrium = 10.9 mm- irregular and thickened   Cx length = 1.9 mm.      Flexion:  Midposition Position: Midline Margins: Smooth Shape: Normal   Contour: Regular Texture: Homogeneous Cavity: Abnormal Masses: Normal    Pelvic findings:    Right Adnexa: Normal   Left Adnexa: Normal   Bladder:  Normal         Cul - de - sac fluid: None    Right ovary:  Appeared normal     Left ovary: not visualized        Comments:  Thickened endometrium despite prior medication and manual vacuum extraction. Recommend OR for d and c.  This was communicated to the patient via  over the phone.  Pt expresses understanding of explanation.      DAGO Cleveland MD, FACOG  [facility]  OB/GYN    3/7/2017  2:53 PM

## 2017-03-10 ENCOUNTER — TELEPHONE (OUTPATIENT)
Dept: OBGYN | Facility: CLINIC | Age: 37
End: 2017-03-10

## 2017-03-10 NOTE — TELEPHONE ENCOUNTER
Confirmed surgery date (int) 3/15/17 with arrival time at 9:00a.m with nothing to eat eight hours before scheduled surgery time and clear liquids up to two hours before scheduled surgery time.     to complete the following fields:            CHECKLIST     Google Calendar : Yes     Resident notified: Not Applicable     Clinic schedule blocked: Not Applicable    Patient notified:Yes      Pre op information sent: Yes     Given to patient over the phone.Yes    Comments:

## 2017-03-15 ENCOUNTER — HOSPITAL ENCOUNTER (OUTPATIENT)
Facility: CLINIC | Age: 37
Discharge: HOME | End: 2017-03-15
Attending: OBSTETRICS & GYNECOLOGY | Admitting: OBSTETRICS & GYNECOLOGY
Payer: COMMERCIAL

## 2017-03-15 ENCOUNTER — ANESTHESIA EVENT (OUTPATIENT)
Dept: SURGERY | Facility: CLINIC | Age: 37
End: 2017-03-15
Payer: COMMERCIAL

## 2017-03-15 ENCOUNTER — SURGERY (OUTPATIENT)
Age: 37
End: 2017-03-15

## 2017-03-15 ENCOUNTER — OFFICE VISIT (OUTPATIENT)
Dept: INTERPRETER SERVICES | Facility: CLINIC | Age: 37
End: 2017-03-15

## 2017-03-15 ENCOUNTER — ANESTHESIA (OUTPATIENT)
Dept: SURGERY | Facility: CLINIC | Age: 37
End: 2017-03-15
Payer: COMMERCIAL

## 2017-03-15 VITALS
RESPIRATION RATE: 16 BRPM | HEIGHT: 60 IN | BODY MASS INDEX: 25.67 KG/M2 | DIASTOLIC BLOOD PRESSURE: 74 MMHG | WEIGHT: 130.73 LBS | TEMPERATURE: 98.4 F | OXYGEN SATURATION: 99 % | SYSTOLIC BLOOD PRESSURE: 119 MMHG

## 2017-03-15 LAB
ABO + RH BLD: ABNORMAL
ABO + RH BLD: ABNORMAL
B-HCG SERPL-ACNC: <1 IU/L (ref 0–5)
BLD GP AB INVEST PLASRBC-IMP: ABNORMAL
BLD GP AB SCN SERPL QL: ABNORMAL
BLOOD BANK CMNT PATIENT-IMP: ABNORMAL
BLOOD BANK CMNT PATIENT-IMP: ABNORMAL
ERYTHROCYTE [DISTWIDTH] IN BLOOD BY AUTOMATED COUNT: 13 % (ref 10–15)
HCG UR QL: NEGATIVE
HCT VFR BLD AUTO: 40.8 % (ref 35–47)
HGB BLD-MCNC: 13.7 G/DL (ref 11.7–15.7)
MCH RBC QN AUTO: 28 PG (ref 26.5–33)
MCHC RBC AUTO-ENTMCNC: 33.6 G/DL (ref 31.5–36.5)
MCV RBC AUTO: 83 FL (ref 78–100)
PLATELET # BLD AUTO: 212 10E9/L (ref 150–450)
RBC # BLD AUTO: 4.9 10E12/L (ref 3.8–5.2)
SPECIMEN EXP DATE BLD: ABNORMAL
WBC # BLD AUTO: 5.6 10E9/L (ref 4–11)

## 2017-03-15 PROCEDURE — 25800025 ZZH RX 258: Performed by: NURSE ANESTHETIST, CERTIFIED REGISTERED

## 2017-03-15 PROCEDURE — 81025 URINE PREGNANCY TEST: CPT | Performed by: ANESTHESIOLOGY

## 2017-03-15 PROCEDURE — 36000059 ZZH SURGERY LEVEL 3 EA 15 ADDTL MIN UMMC: Performed by: OBSTETRICS & GYNECOLOGY

## 2017-03-15 PROCEDURE — 36415 COLL VENOUS BLD VENIPUNCTURE: CPT | Performed by: OBSTETRICS & GYNECOLOGY

## 2017-03-15 PROCEDURE — 00000159 ZZHCL STATISTIC H-SEND OUTS PREP: Performed by: OBSTETRICS & GYNECOLOGY

## 2017-03-15 PROCEDURE — 86901 BLOOD TYPING SEROLOGIC RH(D): CPT | Performed by: OBSTETRICS & GYNECOLOGY

## 2017-03-15 PROCEDURE — 25000125 ZZHC RX 250: Performed by: NURSE ANESTHETIST, CERTIFIED REGISTERED

## 2017-03-15 PROCEDURE — 86850 RBC ANTIBODY SCREEN: CPT | Performed by: OBSTETRICS & GYNECOLOGY

## 2017-03-15 PROCEDURE — T1013 SIGN LANG/ORAL INTERPRETER: HCPCS | Mod: U3

## 2017-03-15 PROCEDURE — 84702 CHORIONIC GONADOTROPIN TEST: CPT | Performed by: OBSTETRICS & GYNECOLOGY

## 2017-03-15 PROCEDURE — 37000009 ZZH ANESTHESIA TECHNICAL FEE, EACH ADDTL 15 MIN: Performed by: OBSTETRICS & GYNECOLOGY

## 2017-03-15 PROCEDURE — 27210794 ZZH OR GENERAL SUPPLY STERILE: Performed by: OBSTETRICS & GYNECOLOGY

## 2017-03-15 PROCEDURE — 25000128 H RX IP 250 OP 636: Performed by: ANESTHESIOLOGY

## 2017-03-15 PROCEDURE — 25000128 H RX IP 250 OP 636: Performed by: NURSE ANESTHETIST, CERTIFIED REGISTERED

## 2017-03-15 PROCEDURE — 36000057 ZZH SURGERY LEVEL 3 1ST 30 MIN - UMMC: Performed by: OBSTETRICS & GYNECOLOGY

## 2017-03-15 PROCEDURE — 25000132 ZZH RX MED GY IP 250 OP 250 PS 637: Performed by: OBSTETRICS & GYNECOLOGY

## 2017-03-15 PROCEDURE — 25000125 ZZHC RX 250: Performed by: OBSTETRICS & GYNECOLOGY

## 2017-03-15 PROCEDURE — 88305 TISSUE EXAM BY PATHOLOGIST: CPT | Performed by: OBSTETRICS & GYNECOLOGY

## 2017-03-15 PROCEDURE — 86900 BLOOD TYPING SEROLOGIC ABO: CPT | Performed by: OBSTETRICS & GYNECOLOGY

## 2017-03-15 PROCEDURE — 85027 COMPLETE CBC AUTOMATED: CPT | Performed by: OBSTETRICS & GYNECOLOGY

## 2017-03-15 PROCEDURE — 86870 RBC ANTIBODY IDENTIFICATION: CPT | Performed by: OBSTETRICS & GYNECOLOGY

## 2017-03-15 PROCEDURE — 37000008 ZZH ANESTHESIA TECHNICAL FEE, 1ST 30 MIN: Performed by: OBSTETRICS & GYNECOLOGY

## 2017-03-15 PROCEDURE — 88305 TISSUE EXAM BY PATHOLOGIST: CPT | Mod: 26 | Performed by: OBSTETRICS & GYNECOLOGY

## 2017-03-15 PROCEDURE — 71000027 ZZH RECOVERY PHASE 2 EACH 15 MINS: Performed by: OBSTETRICS & GYNECOLOGY

## 2017-03-15 PROCEDURE — 40000170 ZZH STATISTIC PRE-PROCEDURE ASSESSMENT II: Performed by: OBSTETRICS & GYNECOLOGY

## 2017-03-15 RX ORDER — NALOXONE HYDROCHLORIDE 0.4 MG/ML
.1-.4 INJECTION, SOLUTION INTRAMUSCULAR; INTRAVENOUS; SUBCUTANEOUS
Status: DISCONTINUED | OUTPATIENT
Start: 2017-03-15 | End: 2017-03-15 | Stop reason: HOSPADM

## 2017-03-15 RX ORDER — SODIUM CHLORIDE, SODIUM LACTATE, POTASSIUM CHLORIDE, CALCIUM CHLORIDE 600; 310; 30; 20 MG/100ML; MG/100ML; MG/100ML; MG/100ML
INJECTION, SOLUTION INTRAVENOUS CONTINUOUS
Status: DISCONTINUED | OUTPATIENT
Start: 2017-03-15 | End: 2017-03-15 | Stop reason: HOSPADM

## 2017-03-15 RX ORDER — SODIUM CHLORIDE, SODIUM LACTATE, POTASSIUM CHLORIDE, CALCIUM CHLORIDE 600; 310; 30; 20 MG/100ML; MG/100ML; MG/100ML; MG/100ML
INJECTION, SOLUTION INTRAVENOUS CONTINUOUS PRN
Status: DISCONTINUED | OUTPATIENT
Start: 2017-03-15 | End: 2017-03-15

## 2017-03-15 RX ORDER — LIDOCAINE HYDROCHLORIDE 10 MG/ML
INJECTION, SOLUTION INFILTRATION; PERINEURAL PRN
Status: DISCONTINUED | OUTPATIENT
Start: 2017-03-15 | End: 2017-03-15 | Stop reason: HOSPADM

## 2017-03-15 RX ORDER — FENTANYL CITRATE 50 UG/ML
INJECTION, SOLUTION INTRAMUSCULAR; INTRAVENOUS PRN
Status: DISCONTINUED | OUTPATIENT
Start: 2017-03-15 | End: 2017-03-15

## 2017-03-15 RX ORDER — PROPOFOL 10 MG/ML
INJECTION, EMULSION INTRAVENOUS PRN
Status: DISCONTINUED | OUTPATIENT
Start: 2017-03-15 | End: 2017-03-15

## 2017-03-15 RX ORDER — KETOROLAC TROMETHAMINE 30 MG/ML
INJECTION, SOLUTION INTRAMUSCULAR; INTRAVENOUS PRN
Status: DISCONTINUED | OUTPATIENT
Start: 2017-03-15 | End: 2017-03-15

## 2017-03-15 RX ORDER — IBUPROFEN 600 MG/1
600 TABLET, FILM COATED ORAL
Status: DISCONTINUED | OUTPATIENT
Start: 2017-03-15 | End: 2017-03-15 | Stop reason: HOSPADM

## 2017-03-15 RX ORDER — ONDANSETRON 2 MG/ML
INJECTION INTRAMUSCULAR; INTRAVENOUS PRN
Status: DISCONTINUED | OUTPATIENT
Start: 2017-03-15 | End: 2017-03-15

## 2017-03-15 RX ORDER — DEXAMETHASONE SODIUM PHOSPHATE 4 MG/ML
INJECTION, SOLUTION INTRA-ARTICULAR; INTRALESIONAL; INTRAMUSCULAR; INTRAVENOUS; SOFT TISSUE PRN
Status: DISCONTINUED | OUTPATIENT
Start: 2017-03-15 | End: 2017-03-15

## 2017-03-15 RX ORDER — FENTANYL CITRATE 50 UG/ML
25-50 INJECTION, SOLUTION INTRAMUSCULAR; INTRAVENOUS
Status: DISCONTINUED | OUTPATIENT
Start: 2017-03-15 | End: 2017-03-15 | Stop reason: HOSPADM

## 2017-03-15 RX ORDER — DOXYCYCLINE 100 MG/10ML
100 INJECTION, POWDER, LYOPHILIZED, FOR SOLUTION INTRAVENOUS
Status: COMPLETED | OUTPATIENT
Start: 2017-03-15 | End: 2017-03-15

## 2017-03-15 RX ORDER — LIDOCAINE HYDROCHLORIDE 20 MG/ML
INJECTION, SOLUTION INFILTRATION; PERINEURAL PRN
Status: DISCONTINUED | OUTPATIENT
Start: 2017-03-15 | End: 2017-03-15

## 2017-03-15 RX ORDER — PROPOFOL 10 MG/ML
INJECTION, EMULSION INTRAVENOUS CONTINUOUS PRN
Status: DISCONTINUED | OUTPATIENT
Start: 2017-03-15 | End: 2017-03-15

## 2017-03-15 RX ORDER — LIDOCAINE 40 MG/G
CREAM TOPICAL
Status: DISCONTINUED | OUTPATIENT
Start: 2017-03-15 | End: 2017-03-15 | Stop reason: HOSPADM

## 2017-03-15 RX ORDER — ONDANSETRON 4 MG/1
4 TABLET, ORALLY DISINTEGRATING ORAL EVERY 30 MIN PRN
Status: DISCONTINUED | OUTPATIENT
Start: 2017-03-15 | End: 2017-03-15 | Stop reason: HOSPADM

## 2017-03-15 RX ORDER — ONDANSETRON 4 MG/1
4 TABLET, ORALLY DISINTEGRATING ORAL
Status: DISCONTINUED | OUTPATIENT
Start: 2017-03-15 | End: 2017-03-15 | Stop reason: HOSPADM

## 2017-03-15 RX ORDER — ONDANSETRON 2 MG/ML
4 INJECTION INTRAMUSCULAR; INTRAVENOUS EVERY 30 MIN PRN
Status: DISCONTINUED | OUTPATIENT
Start: 2017-03-15 | End: 2017-03-15 | Stop reason: HOSPADM

## 2017-03-15 RX ADMIN — KETOROLAC TROMETHAMINE 30 MG: 30 INJECTION, SOLUTION INTRAMUSCULAR at 13:07

## 2017-03-15 RX ADMIN — PROPOFOL 75 MCG/KG/MIN: 10 INJECTION, EMULSION INTRAVENOUS at 12:41

## 2017-03-15 RX ADMIN — ONDANSETRON 4 MG: 2 INJECTION INTRAMUSCULAR; INTRAVENOUS at 12:49

## 2017-03-15 RX ADMIN — FENTANYL CITRATE 25 MCG: 50 INJECTION, SOLUTION INTRAMUSCULAR; INTRAVENOUS at 12:44

## 2017-03-15 RX ADMIN — SODIUM CHLORIDE, POTASSIUM CHLORIDE, SODIUM LACTATE AND CALCIUM CHLORIDE: 600; 310; 30; 20 INJECTION, SOLUTION INTRAVENOUS at 12:28

## 2017-03-15 RX ADMIN — PROPOFOL 20 MG: 10 INJECTION, EMULSION INTRAVENOUS at 13:10

## 2017-03-15 RX ADMIN — DEXMEDETOMIDINE 4 MCG: 100 INJECTION, SOLUTION, CONCENTRATE INTRAVENOUS at 12:47

## 2017-03-15 RX ADMIN — LIDOCAINE HYDROCHLORIDE 80 MG: 20 INJECTION, SOLUTION INFILTRATION; PERINEURAL at 12:38

## 2017-03-15 RX ADMIN — SILVER NITRATE APPLICATORS 1 APPLICATOR: 25; 75 STICK TOPICAL at 13:09

## 2017-03-15 RX ADMIN — DEXAMETHASONE SODIUM PHOSPHATE 4 MG: 4 INJECTION, SOLUTION INTRAMUSCULAR; INTRAVENOUS at 13:06

## 2017-03-15 RX ADMIN — FENTANYL CITRATE 25 MCG: 50 INJECTION, SOLUTION INTRAMUSCULAR; INTRAVENOUS at 12:40

## 2017-03-15 RX ADMIN — ONDANSETRON 4 MG: 2 INJECTION INTRAMUSCULAR; INTRAVENOUS at 14:40

## 2017-03-15 RX ADMIN — PROPOFOL 50 MG: 10 INJECTION, EMULSION INTRAVENOUS at 12:38

## 2017-03-15 RX ADMIN — DOXYCYCLINE HYCLATE 100 MG: 100 INJECTION, POWDER, LYOPHILIZED, FOR SOLUTION INTRAVENOUS at 12:31

## 2017-03-15 RX ADMIN — PROPOFOL 40 MG: 10 INJECTION, EMULSION INTRAVENOUS at 12:45

## 2017-03-15 RX ADMIN — LIDOCAINE HYDROCHLORIDE 20 ML: 10 INJECTION, SOLUTION INFILTRATION; PERINEURAL at 13:08

## 2017-03-15 RX ADMIN — DEXMEDETOMIDINE 4 MCG: 100 INJECTION, SOLUTION, CONCENTRATE INTRAVENOUS at 12:52

## 2017-03-15 RX ADMIN — FENTANYL CITRATE 25 MCG: 50 INJECTION, SOLUTION INTRAMUSCULAR; INTRAVENOUS at 12:52

## 2017-03-15 NOTE — IP AVS SNAPSHOT
MAIN OR    [address]    [place] [address]    Phone:  [phone]                                       After Visit Summary   3/15/2017    Franny Muro    MRN: [id number]           After Visit Summary Signature Page     I have received my discharge instructions, and my questions have been answered. I have discussed any challenges I see with this plan with the nurse or doctor.    ..........................................................................................................................................  Patient/Patient Representative Signature      ..........................................................................................................................................  Patient Representative Print Name and Relationship to Patient    ..................................................               ................................................  Date                                            Time    ..........................................................................................................................................  Reviewed by Signature/Title    ...................................................              ..............................................  Date                                                            Time

## 2017-03-15 NOTE — H&P
"OB/GYN History and Physical    CC:  Retained products of conception    HPI:  Franny Muro is a 36 year old  female with retained products of conception who presents for definitive surgical management. Briefly, pt had an  on 2016 which was complicated by a retained placenta that required manual extraction.  She continued to have abdominal pain and vaginal bleeding following her delivery and was found to have retained products of conception on an ultrasound.  She underwent a MVA in clinic on 2017 - pathology returned back as \"fragments of endometrium some with embedded atrophic chronic villi as well as necrotic and sclerotic chorionic villi.\"  Her bleeding stopped after this procedure, however, a follow up ultrasound continued to demonstrate concern for retained products.  She reports no intercourse since delivery.  The patient reports she is doing well overall.  No fever, chills, SOB, chest pain, diarrhea, N/V, constipation.     Allergies:  NNKDA    Medications:  Prenatal vitamins  Vitamin D    PMHx:  None    PSHx:  None    FHx:  Non-contributory    SHx:  Does not smoke, drink, or use illicit drugs.  Pt is Armenian speaking    ROS:  General: Denies fever, chills  HEENT: Denies headache, blurry vision  CV: Denies chest pain, palpitation  Resp: Denies SOB, cough  GI: Denies nausea, vomiting, constipation, diarrhea  : Denies dysuria, burning, or itching  Neuro: Denies numbness, tingling   Psych: Denies SI/HI    Vitals:  Vitals:    03/15/17 0924   BP: 120/85   Resp: 16   Temp: 98.4  F (36.9  C)   TempSrc: Oral   SpO2: 100%   Weight: 59.3 kg (130 lb 11.7 oz)   Height: 1.524 m (5')       General:  NAD, appears generally well  CV:  RRR, no m/r/g  Resp:  CTAB, non-labored breathing  Abd:  Soft, non-tender, non-distended  Pelvic:  Deferred  Ext:  No LE edema, moves all extremities      Imaging - 3/7/2017  Uterus 5.0 x 5.1 x 4.8 cm  EMS 10.9 mm, irregular and thickened    A/P:  36 year old  " female with retained products of conception.    -Patient was consented for a hysteroscopy, dilation and curettage  -Will plan for doxycyline 100 mg prior to procedure and 200 mg following procedure  -Will order serum beta hCG to trend beta level given persistent retained products  -Pt has ibuprofen at home and does not need a script for home  -Post-op appt already scheduled for 3/29/17 with Dr. Scar Levi MD  OB/GYN PGY4    [facility] staff:  Appreciate note by Dr. Levi.  I have seen and examined the patient without the resident. I have reviewed, edited, and agree with the note.        Nuha Solis MD, FACOG  3/15/2017  10:54 AM

## 2017-03-15 NOTE — ANESTHESIA PREPROCEDURE EVALUATION
Anesthesia Evaluation     . Pt has had prior anesthetic. Type: General    No history of anesthetic complications     ROS/MED HX    ENT/Pulmonary:  - neg pulmonary ROS     Neurologic:  - neg neurologic ROS     Cardiovascular:  - neg cardiovascular ROS   (+) ----. : . . . :. . No previous cardiac testing       METS/Exercise Tolerance:  >4 METS   Hematologic:  - neg hematologic  ROS       Musculoskeletal:  - neg musculoskeletal ROS       GI/Hepatic:  - neg GI/hepatic ROS       Renal/Genitourinary:  - ROS Renal section negative       Endo:  - neg endo ROS       Psychiatric:  - neg psychiatric ROS       Infectious Disease:  - neg infectious disease ROS       Malignancy:      - no malignancy   Other:    - neg other ROS           Physical Exam  Normal systems: pulmonary and dental    Airway   Mallampati: II  TM distance: >3 FB  Neck ROM: full    Dental     Cardiovascular   Rhythm and rate: regular and normal      Pulmonary                        Lab / Radiology Results:   Reviewed current labs when avail, see EMR for details.      BMP:  No results for input(s): NA, POTASSIUM, CHLORIDE, CO2, BUN, CR, GLC, MIGUELINA in the last 44430 hours.    Invalid input(s): MG    LFTs:   No results for input(s): PROTTOTAL, ALBUMIN, BILITOTAL, ALKPHOS, AST, ALT, BILIDIRECT in the last 94463 hours.    CBC:   Recent Labs   Lab Test  03/15/17   0937   WBC  5.6   HGB  13.7   PLT  212       Coags:  No results for input(s): INR, PTT, FIBR in the last 19622 hours.    Blood Bank:  Lab Results   Component Value Date    ABO Pending 03/15/2017    RH Pending 03/15/2017    AS Pending 03/15/2017       Studies:  See EMR for current studies, reviewed when available.     Anesthesia Plan      History & Physical Review  History and physical reviewed and following examination; no interval change.    ASA Status:  1 .    NPO Status:  > 8 hours    Plan for MAC with Intravenous induction. Maintenance will be TIVA.  Reason for MAC:  Deep or markedly invasive  procedure (G8)  PONV prophylaxis:  Ondansetron (or other 5HT-3) and Dexamethasone or Solumedrol       Postoperative Care  Postoperative pain management:  Multi-modal analgesia.      Consents  Anesthetic plan, risks, benefits and alternatives discussed with:  Patient.  Use of blood products discussed: Yes.   Use of blood products discussed with Patient.  Consented to blood products.  .      Jerald Dooley MD  Anesthesiologist  11:19 AM  March 15, 2017                        .

## 2017-03-15 NOTE — IP AVS SNAPSHOT
MRN:[id number]                      After Visit Summary   3/15/2017    Franny Muro    MRN: [id number]           Thank you!     Thank you for choosing [facility] for your care. Our goal is always to provide you with excellent care. Hearing back from our patients is one way we can continue to improve our services. Please take a few minutes to complete the written survey that you may receive in the mail after you visit with us. Thank you!        Patient Information     Date Of Birth          1980        About your hospital stay     You were admitted on:  March 15, 2017 You last received care in the:  [facility] OR    You were discharged on:  March 15, 2017       Who to Call     For medical emergencies, please call 911.  For non-urgent questions about your medical care, please call your primary care provider or clinic, [phone]  For questions related to your surgery, please call your surgery clinic        Attending Provider     Provider Nuha Johnson MD OB/Gyn       Primary Care Provider Office Phone # Fax #    [facility] [phone]       [address] [place] [address]        After Care Instructions     Discharge Instructions       Resume pre procedure diet            Discharge Instructions       Pelvic Rest. No tampons, douching or intercourse for  2  weeks.            No alcohol       NO ALCOHOL for 24 hours post procedure            No driving or operating machinery       No driving or operating machinery until day after procedure                  Your next 10 appointments already scheduled     Mar 29, 2017  1:45 PM CDT   Return Visit with Mónica Abbott MD   [facility] ([facility])    [facility] [address]  [address] [facility] [id number] [phone]              Further instructions from your care team       Discharge Instructions:  Following a Dilation   and Curettage/Dilation and Evacuation    What to expect:    Expect small to moderate amount of vaginal bleeding which should taper off in 4-5 days. It should not be heavier than your regular menstrual flow.    Do not douche, and use a pad rather than tampons.     No intercourse until bleeding has ceased.    Activity:    Rest the day of surgery. You may resume normal activity the next day.    You may bathe or shower.    Avoid heavy lifting (10-15 lbs) for one week.    Comfort:    The amount of discomfort you can expect is very unpredictable. If you have pain that cannot be controlled with non-aspirin pain relievers or with the prescription you may have received, you should notify your doctor.    Abdominal cramping (like menstrual cramps) or low back ache are common and should not be a cause for concern. You will be drowsy and weak the day of surgery and possibly the following day.    Diet:    You have no restrictions on your diet. Following surgery, drink plenty of fluids and eat a light meal.    Nausea:    The anesthesia medications you received during your surgical procedure may produce some nausea.    If you feel nauseated, stay in bed, keep your head down and try drinking fluids such as Seven-Up, tea or soup.    Notify Physician at once if you experience:    A fever over 100 degrees (a low grade fever under 100 degrees is usual after surgery).    Heavy flow and/or passing large clots. Saturating more than 1 pad per hour for 2 or more hours.     Severe pain or cramps.  Rev. 5/12    Same-Day Surgery   Adult Discharge Orders & Instructions     For 24 hours after surgery:  1. Get plenty of rest.  A responsible adult must stay with you for at least 24 hours after you leave the hospital.   2. Pain medication can slow your reflexes. Do not drive or use heavy equipment.  If you have weakness or tingling, don't drive or use heavy equipment until this feeling goes away.  3. Mixing alcohol and pain  medication can cause dizziness and slow your breathing. It can even be fatal. Do not drink alcohol while taking pain medication.  4. Avoid strenuous or risky activities.  Ask for help when climbing stairs.   5. You may feel lightheaded.  If so, sit for a few minutes before standing.  Have someone help you get up.   6. If you have nausea (feel sick to your stomach), drink only clear liquids such as apple juice, ginger ale, broth or 7-Up.  Rest may also help.  Be sure to drink enough fluids.  Move to a regular diet as you feel able. Take pain medications with a small amount of solid food, such as toast or crackers, to avoid nausea.   7. A slight fever is normal. Call the doctor if your fever is over 100 F (37.7 C) (taken under the tongue) or lasts longer than 24 hours.  8. You may have a dry mouth, muscle aches, trouble sleeping or a sore throat.  These symptoms should go away after 24 hours.  9. Do not make important or legal decisions.   Pain Management:      1. Take pain medication (if prescribed) for pain as directed by your physician.        2. WARNING: If the pain medication you have been prescribed contains Tylenol  (acetaminophen), DO NOT take additional doses of Tylenol (acetaminophen).     Call your doctor for any of the followin.  Signs of infection (fever, growing tenderness at the surgery site, severe pain, a large amount of drainage or bleeding, foul-smelling drainage, redness, swelling).    2.  It has been over 8 to 10 hours since surgery and you are still not able to urinate (pee).    3.  Headache for over 24 hours.    4.  Numbness, tingling or weakness the day after surgery (if you had spinal anesthesia).  To contact a doctor, call ___Dr Solis [phone]____ or:      [phone] and ask for the Resident On Call for:          ______OB/GYN_____ (answered 24 hours a day)      Emergency Department:  [facility]: [phone]  [facility]:  "[phone]  [facility]: [phone]             Rev. 10/2014       Pending Results     No orders found from 3/13/2017 to 3/16/2017.            Admission Information     Date & Time Provider Department Dept. Phone    3/15/2017 Nuha Solis MD [facility] MAIN [phone]      Your Vitals Were     Blood Pressure Temperature Respirations Height Weight Pulse Oximetry    119/64 97.7  F (36.5  C) (Oral) 10 1.524 m (5') 59.3 kg (130 lb 11.7 oz) 100%    BMI (Body Mass Index)                   25.53 kg/m2           MyChart Information     Pan[employer]to lets you send messages to your doctor, view your test results, renew your prescriptions, schedule appointments and more. To sign up, go to [email or web address] . Click on \"Log in\" on the left side of the screen, which will take you to the Welcome page. Then click on \"Sign up Now\" on the right side of the page.     You will be asked to enter the access code listed below, as well as some personal information. Please follow the directions to create your username and password.     Your access code is: [email or web address]  Expires: 3/28/2017 10:57 AM     Your access code will  in 90 days. If you need help or a new code, please call your [facility] clinic or [phone].        Care EveryWhere ID     This is your Care EveryWhere ID. This could be used by other organizations to access your [facility] medical records  [id number]           Review of your medicines      CONTINUE these medicines which have NOT CHANGED        Dose / Directions    prenatal multivitamin  plus iron 27-0.8 MG Tabs per tablet        Dose:  1 tablet   Take 1 tablet by mouth daily   Refills:  0       VITAMIN D (CHOLECALCIFEROL) PO        Take by mouth daily   Refills:  0                Protect others around you: Learn how to safely use, store and throw away your medicines at www.disposemymeds.org.             Medication List: This is a list of all your medications " and when to take them. Check marks below indicate your daily home schedule. Keep this list as a reference.      Medications           Morning Afternoon Evening Bedtime As Needed    prenatal multivitamin  plus iron 27-0.8 MG Tabs per tablet   Take 1 tablet by mouth daily                                VITAMIN D (CHOLECALCIFEROL) PO   Take by mouth daily

## 2017-03-15 NOTE — OP NOTE
[facility]  Operative Note    Patient: Franny Muro  : 1980  MRN: [id number]    Date of Service: 3/15/2017    Pre-Op Diagnosis:   1. Retained products of conception     Post-Op Diagnosis: Same, s/p procedure below      Procedure: EUA, hysteroscopy with myosure morcellation of retained products of conception     Surgeon: Dr. Solis  Assistants: Blanche Levi PGY4, Amy Hurt PGY1, Kristina Vyas MS4     Anesthesia: MAC, paracervical block     Fluids: 600 mL  EBL: 20 mL  UOP: Not drained  Fluid Deficit: 200 mL     Findings: Midposition uterus on EUA. Normal appearing cervix without lesions. Areas in the endometrium looked devascularized with sclerotic/fibrous appearing tissue. This seemed consistent with likely retained placental tissue. Both ostia visualized. Able to demonstrate a normal uterine contour at end of case.     Specimens: Likely products of conception  Complications: None apparent    Indications: Franny Muro is a 36 year old female who had  on 2016 complicated by retained placenta requiring manual extraction. On 17 she presented with vaginal bleeding and abdominal pain and underwent MVA in clinic on 17. Follow up ultrasound on 17 showed retained products of conception. She was seen in clinic on 3/2/17 and given methergine and misoprostol for medical management. She had follow up ultrasound on 3/7/17 that showed continued thickened and irregular 10.9 mm endometrium as well as continued light bleeding. At this point she decided to proceed with surgical management. Risks, benefits, and alternatives to the procedure were discussed. The patient's questions were answered, understanding confirmed, and the patient signed written informed consent.    Technique: The patient was taken to the operating room where she was placed in the dorsal lithotomy position with feet in yellow fin stirrups. The patient was placed under MAC anesthesia. An exam under anesthesia was  perfromed. The patient was prepped and draped in the usual sterile fashion. A speculum was placed in the vagina and the cervix visualized. 1 cc 1% plain lidocaine was injected into the anterior cervical lip at 12 o'clock. A single-toothed tenaculum was placed on the anterior lip of the cervix at 12 o'clock. A total of 19 cc 1% plain lidocaine was injected at 4 and 8 o'clock to produce a paracervical block.  The cervical os was carefully dilated to 6 mm with sequential dilators. The operating hysteroscope introducer was placed through the cervix into the uterine cavity. The hysteroscope was attached. Examination of the uterine cavity demonstrated devascularized endometrium with fibrous/sclerotic tissue as noted above.  Pictures were taken. The reciprocating hysteroscopic morcellator was used to remove the tissue that appeared abnormal and consistent with retained products of conception with good success. Pictures were taken. The hysteroscope apparatus was removed and total of 200 mL fluid deficit of normal saline noted. A sharp curet was used to scrape the uterus which was gritty on all aspects and with minimal return of tissue. The curetings were sent to pathology. The tenaculum was removed from the cervix and good hemostasis was noted with silver nitrate cauterization. The speculum was removed from the vagina.    Instrument counts were correct x2. Dr. Solis was present and scrubbed for the entire procedure. The patient was awoken in the OR and transferred to the PACU in stable condition.    Amy Hurt DO  3/15/2017 2:18 PM     Staff:  I was scrubbed and present for entire case and agree w/ above note.    Nuha Solis MD

## 2017-03-15 NOTE — BRIEF OP NOTE
Brief Operative Note  Obstetrics and Gynecology    Franny Muro  [id number]  1980      Pre-Op Diagnosis:    1. Retained products of conception    Post-Op Diagnosis: Same, s/p procedure below     Procedure: EUA, hysteroscopy with myosure morcellation of retained products of conception    Surgeon: Dr. Solis  Assistants: Blanche Levi PGY4, Amy Hurt PGY1, Kristina Vyas MS4    Anesthesia: MAC, paracervical block    Fluids: 600 mL  EBL: 20 mL  UOP: Not drained  Fluid Deficit: 200 mL    Findings: Midposition uterus on EUA. Normal appearing cervix without lesions. Endometrium looked devascularized with almost sclerotic/fibrous appearing tissue. This seemed consistent with likely retained placental tissue. Both ostia visualized. Able to demonstrate a normal uterine contour at end of case.    Specimens: Likely products of conception    Complications: None apparent    Condition: Stable to PACU    Disposition: Discharge to home     Amy Hurt DO  Ob/Gyn PGY-1  03/15/17 1:15 PM

## 2017-03-15 NOTE — ANESTHESIA POSTPROCEDURE EVALUATION
Patient: Franny Muro    Procedure(s):  Dilation And Curettage, Hysteroscopy Myosure Removal of Retained Placenta with Morcellator    - Wound Class: II-Clean Contaminated    Diagnosis:Retained Products Of Conception  Diagnosis Additional Information: No value filed.    Anesthesia Type:  MAC    Note:  Anesthesia Post Evaluation    Patient location during evaluation: Phase 2  Patient participation: Able to fully participate in evaluation  Level of consciousness: awake and alert  Pain management: adequate  Airway patency: patent  Cardiovascular status: acceptable and hemodynamically stable  Respiratory status: acceptable  Hydration status: acceptable  PONV: none     Anesthetic complications: None          Last vitals:  Vitals:    03/15/17 0924 03/15/17 1321   BP: 120/85 119/64   Resp: 16 10   Temp: 36.9  C (98.4  F) 36.5  C (97.7  F)   SpO2: 100%          Electronically Signed By: Jerald Dooley MD  March 15, 2017  1:41 PM

## 2017-03-15 NOTE — DISCHARGE INSTRUCTIONS
Discharge Instructions: Following a Dilation   and Curettage/Dilation and Evacuation    What to expect:    Expect small to moderate amount of vaginal bleeding which should taper off in 4-5 days. It should not be heavier than your regular menstrual flow.    Do not douche, and use a pad rather than tampons.     No intercourse until bleeding has ceased.    Activity:    Rest the day of surgery. You may resume normal activity the next day.    You may bathe or shower.    Avoid heavy lifting (10-15 lbs) for one week.    Comfort:    The amount of discomfort you can expect is very unpredictable. If you have pain that cannot be controlled with non-aspirin pain relievers or with the prescription you may have received, you should notify your doctor.    Abdominal cramping (like menstrual cramps) or low back ache are common and should not be a cause for concern. You will be drowsy and weak the day of surgery and possibly the following day.    Diet:    You have no restrictions on your diet. Following surgery, drink plenty of fluids and eat a light meal.    Nausea:    The anesthesia medications you received during your surgical procedure may produce some nausea.    If you feel nauseated, stay in bed, keep your head down and try drinking fluids such as Seven-Up, tea or soup.    Notify Physician at once if you experience:    A fever over 100 degrees (a low grade fever under 100 degrees is usual after surgery).    Heavy flow and/or passing large clots. Saturating more than 1 pad per hour for 2 or more hours.     Severe pain or cramps.  Rev. 5/12    Same-Day Surgery   Adult Discharge Orders & Instructions     For 24 hours after surgery:  1. Get plenty of rest.  A responsible adult must stay with you for at least 24 hours after you leave the hospital.   2. Pain medication can slow your reflexes. Do not drive or use heavy equipment.  If you have weakness or tingling, don't drive or use heavy equipment until this feeling goes  away.  3. Mixing alcohol and pain medication can cause dizziness and slow your breathing. It can even be fatal. Do not drink alcohol while taking pain medication.  4. Avoid strenuous or risky activities.  Ask for help when climbing stairs.   5. You may feel lightheaded.  If so, sit for a few minutes before standing.  Have someone help you get up.   6. If you have nausea (feel sick to your stomach), drink only clear liquids such as apple juice, ginger ale, broth or 7-Up.  Rest may also help.  Be sure to drink enough fluids.  Move to a regular diet as you feel able. Take pain medications with a small amount of solid food, such as toast or crackers, to avoid nausea.   7. A slight fever is normal. Call the doctor if your fever is over 100 F (37.7 C) (taken under the tongue) or lasts longer than 24 hours.  8. You may have a dry mouth, muscle aches, trouble sleeping or a sore throat.  These symptoms should go away after 24 hours.  9. Do not make important or legal decisions.   Pain Management:      1. Take pain medication (if prescribed) for pain as directed by your physician.        2. WARNING: If the pain medication you have been prescribed contains Tylenol  (acetaminophen), DO NOT take additional doses of Tylenol (acetaminophen).     Call your doctor for any of the followin.  Signs of infection (fever, growing tenderness at the surgery site, severe pain, a large amount of drainage or bleeding, foul-smelling drainage, redness, swelling).    2.  It has been over 8 to 10 hours since surgery and you are still not able to urinate (pee).    3.  Headache for over 24 hours.    4.  Numbness, tingling or weakness the day after surgery (if you had spinal anesthesia).  To contact a doctor, call ___Dr Solis [phone]____ or:      [phone] and ask for the Resident On Call for:          ______OB/GYN_____ (answered 24 hours a day)      Emergency Department:  [place] [facility]: [phone]  [place]  Emergency Department: [phone]  [facility]: [phone]             Rev. 10/2014

## 2017-03-15 NOTE — ANESTHESIA CARE TRANSFER NOTE
Patient: Franny Muro    Procedure(s):  Dilation And Curettage, Hysteroscopy Myosure Removal of Retained Placenta with Morcellator    - Wound Class: II-Clean Contaminated    Diagnosis: Retained Products Of Conception  Diagnosis Additional Information: No value filed.    Anesthesia Type:   MAC     Note:  Airway :Room Air  Patient transferred to:Phase II  Comments: Arrived in PACU, report to RN, vitals stable, patient comfortable.        Vitals: (Last set prior to Anesthesia Care Transfer)    CRNA VITALS  3/15/2017 1256 - 3/15/2017 1326      3/15/2017             Resp Rate (set): 10                Electronically Signed By: HOUSTON Ott CRNA  March 15, 2017  1:26 PM

## 2017-03-20 LAB — COPATH REPORT: NORMAL

## 2017-03-29 ENCOUNTER — OFFICE VISIT (OUTPATIENT)
Dept: OBGYN | Facility: CLINIC | Age: 37
End: 2017-03-29
Payer: COMMERCIAL

## 2017-03-29 VITALS
WEIGHT: 130 LBS | BODY MASS INDEX: 25.52 KG/M2 | HEART RATE: 64 BPM | HEIGHT: 60 IN | SYSTOLIC BLOOD PRESSURE: 129 MMHG | DIASTOLIC BLOOD PRESSURE: 81 MMHG

## 2017-03-29 DIAGNOSIS — Z98.890 S/P DILATION AND CURETTAGE: Primary | ICD-10-CM

## 2017-03-29 PROCEDURE — 99212 OFFICE O/P EST SF 10 MIN: CPT | Mod: ZF

## 2017-03-29 RX ORDER — CHOLECALCIFEROL (VITAMIN D3) 50 MCG
2000 TABLET ORAL
COMMUNITY
Start: 2016-07-13

## 2017-03-29 ASSESSMENT — PAIN SCALES - GENERAL: PAINLEVEL: NO PAIN (0)

## 2017-03-29 NOTE — MR AVS SNAPSHOT
After Visit Summary   3/29/2017    Franny Muro    MRN: [id number]           Patient Information     Date Of Birth          1980        Visit Information        Provider Department      3/29/2017 1:30 PM Mónica Abbott MD; [employer] [facility] [facility]        Today's Diagnoses     S/P dilation and curettage    -  1      Care Instructions    - Schedule US for 1 month from now        Follow-ups after your visit        Follow-up notes from your care team     Return in about 4 weeks (around 4/26/2017).      Your next 10 appointments already scheduled     Apr 26, 2017  9:30 AM CDT   ULTRASOUND with [facility] ULTRASOUND   [facility] ([facility])    [facility] [address]  [address]  [facility] [address] [phone]              Future tests that were ordered for you today     Open Future Orders        Priority Expected Expires Ordered    US GYN Complete Transvaginal - 78175 (In Clinic) Routine  7/27/2017 3/29/2017            Who to contact     Please call your clinic at [phone] to:    Ask questions about your health    Make or cancel appointments    Discuss your medicines    Learn about your test results    Speak to your doctor   If you have compliments or concerns about an experience at your clinic, or if you wish to file a complaint, please contact [facility] Patient Relations at [phone] or email us at [email or web address]         Additional Information About Your Visit        MyChart Information     [employer] is an electronic gateway that provides easy, online access to your medical records. With [employer], you can request a clinic appointment, read your test results, renew a prescription or communicate with your care team.     To sign up for Inv[employer] visit the website at [email or web address]   You will be asked to enter the access code  listed below, as well as some personal information. Please follow the directions to create your username and password.     Your access code is: [email or web address]  Expires: 2017  2:39 PM     Your access code will  in 90 days. If you need help or a new code, please contact your [facility] or call [phone] for assistance.        Care EveryWhere ID     This is your Care EveryWhere ID. This could be used by other organizations to access your [facility] medical records  [id number]        Your Vitals Were     Pulse Height BMI (Body Mass Index)             64 1.524 m (5') 25.39 kg/m2          Blood Pressure from Last 3 Encounters:   17 129/81   03/15/17 119/74   17 115/63    Weight from Last 3 Encounters:   17 59 kg (130 lb)   03/15/17 59.3 kg (130 lb 11.7 oz)   17 59.6 kg (131 lb 8 oz)               Primary Care Provider Office Phone # Fax #    [facility] [phone]       [address] [place] [address]        Thank you!     Thank you for choosing [facility]  for your care. Our goal is always to provide you with excellent care. Hearing back from our patients is one way we can continue to improve our services. Please take a few minutes to complete the written survey that you may receive in the mail after your visit with us. Thank you!             Your Updated Medication List - Protect others around you: Learn how to safely use, store and throw away your medicines at www.disposemymeds.org.          This list is accurate as of: 3/29/17  2:39 PM.  Always use your most recent med list.                   Brand Name Dispense Instructions for use    prenatal multivitamin  plus iron 27-0.8 MG Tabs per tablet      Take 1 tablet by mouth daily       vitamin D 2000 UNITS tablet      Take 2,000 Units by mouth

## 2017-03-30 NOTE — PROGRESS NOTES
"[facility]  Postoperative Visit  3/29/2017    S: Franny Muro is a 36 year old  here for post-operative visit following a EUA, hysteroscopy, dilation and curettage on 3/15/17 for management of retained products of conception. Briefly, Franny had a  on 16 which was complicated by a retained placenta requiring a manual extraction. On 17 she presented for evaluation of low abdominal pain and vaginal bleeding. An US was performed showing retained products of conception. In 17 she underwent a D+C in clinic which was uncomplicated. Pathology returned showing some chorionic villi and decidua. On 17 she had a follow up US performed which showed that she continues to have retained products of conception present. She tried medical management on 3/2/17 which was unsuccessful and ultimately desired definitive surgical management.     Franny has been doing well overall since her surgery.  She initially had dizziness which lasted for 4 days but has subsequently resolved.  She attributes this to the anesthesia she received during her operation.  She had bleeding which lasted for 2 days after her procedure but denies bleeding currently.  She continues to have RLQ pain which she describes as a \"poking\" sensation after she holds her baby for a long period of time.    O:   /81 (BP Location: Right arm, Patient Position: Chair, Cuff Size: Adult Regular)  Pulse 64  Ht 1.524 m (5')  Wt 59 kg (130 lb)  BMI 25.39 kg/m2    Gen: sitting, appears comfortable  Resp: breathing comfortably on room air  Abd: soft, mildly tender to deep palpation in RLQ. Non distended.    Labs:   Hemoglobin   Date Value Ref Range Status   03/15/2017 13.7 11.7 - 15.7 g/dL Final       Pathology:   Copath Report   Date Value Ref Range Status   03/15/2017   Final    Patient Name: FRANNY YEAGER  MR#: [id number]  Specimen #: [id number]  Collected: 3/15/2017  Received: 3/15/2017  Reported: 3/20/2017 " "14:30  Ordering Phy(s): ANNA STOREY    For improved result formatting, select 'View Enhanced Report Format'  under Linked Documents section.    SPECIMEN(S):  Products of conception    FINAL DIAGNOSIS:    Products of conception, retained, dilatation and curettage:         - Fragments of endometrium, myometrium, sclerotic villi, patchy  calcifications, and          myometrium with embedded atrophic villi.    I have personally reviewed all specimens and or slides, including the  listed special stains, and used them with my medical judgement to  determine the final diagnosis.    Electronically signed out by:    Amish Rivera M.D., [facility]    CLINICAL HISTORY:  Retained products of conception.    GROSS:  The specimen is received in formalin with proper patient's  identification and labeled \"retained placenta\" and consists of multiple  red-tan soft tissue fragments in a suction sac measuring in aggregate  2.1 x 1.6 x 0.3 cm. the total weight of specimen is 0.9 g.  No gross  evidence of chorionic villi or fetal part is identified.  The tissue  fragments are wrapped. Entirely submitted in one cassette. (Dictated by:  Nichole Guevara 3/15/2017 02:36 PM)    MICROSCOPIC:  A microscopic examination was done. The results of the exam are  reflected in the above diagnoses. No evidence of a molar gestation or  malignancy is seen. (Amish Rivera M.D.)    CPT Codes:  A: 60519-YO4, [facility]    TESTING LAB LOCATION:  [address] [address]  [phone]    COLLECTION SITE:  Client: [facility]  Location: UROR (B)         A/P: Franny Muro is a 36 year old female POD#14 s/p hysteroscopy, D+C for management of retained POC. Doing well, no concerns.    #Retained POC: Discussed that the procedure she had should be definitive management for this condition. Given everything she has been through, Franny " prefers this be confirmed with US.  - Pelvic US ordered, will have performed in 1 month  - She will return sooner for abnormal vaginal bleeding, cramping, pelvic pain, fever    #RLQ pain: Only brought on by holding her child for a long period of time  - Discussed that it is most likely musculoskeletal in nature  - Comfort measures like warm packs and tylenol were encouraged     Mónica Abbott MD  Ob/Gyn, PGY-4  3/29/2017, 8:34 PM  I agree with note as above.  Assessment and plan were jointly made.  Emiliana Zacarias MD

## 2017-04-26 ENCOUNTER — OFFICE VISIT (OUTPATIENT)
Dept: OBGYN | Facility: CLINIC | Age: 37
End: 2017-04-26
Attending: OBSTETRICS & GYNECOLOGY

## 2017-04-26 DIAGNOSIS — Z98.890 S/P DILATION AND CURETTAGE: ICD-10-CM

## 2017-04-26 PROCEDURE — T1013 SIGN LANG/ORAL INTERPRETER: HCPCS | Mod: U3,ZF

## 2017-04-26 PROCEDURE — 76830 TRANSVAGINAL US NON-OB: CPT | Mod: ZF

## 2017-04-26 NOTE — LETTER
4/26/2017       RE: Franny Muro  [address]  [place] [facility] [address]     Dear Colleague,    Thank you for referring your patient, Franny Muro, to the [facility] at [facility]. Please see a copy of my visit note below.    36 year old female presents for gynecologic ultrasound indicated by S/P dilation and curettage.  This study was done transvaginally.    Uterine findings:   Presence: Visible Size: Normal 3.5 x 4.9 x 4.3 cm.  Endometrium = 8.0 mm.   Cx length = 19.8 mm.      Flexion:  Anteverted Position: Midline Margins: Smooth Shape: normal   Contour: Irregular Texture: Homogeneous Cavity: Normal Masses: Normal    Pelvic findings:    Right Adnexa: Normal   Left Adnexa: Normal   Bladder:  Normal         Cul - de - sac fluid: None    Ovarian follicles:   Right ovary:  3.2 x 3.2 x 2.1cm.     0 follicles     Left ovary:  1.9 x 2.3 x 1.5cm.     0 follicles    Comments:  Normal Pelvic Ultrasound.  Appropriate endometrial stripe 1 month s/p D&C for RPOC.    DAGO Cleveland MD

## 2017-04-26 NOTE — LETTER
4/26/2017      RE: Franny Muro  [address]  [facility] [address]       36 year old female presents for gynecologic ultrasound indicated by S/P dilation and curettage.  This study was done transvaginally.    Uterine findings:   Presence: Visible Size: Normal 3.5 x 4.9 x 4.3 cm.  Endometrium = 8.0 mm.   Cx length = 19.8 mm.      Flexion:  Anteverted Position: Midline Margins: Smooth Shape: normal   Contour: Irregular Texture: Homogeneous Cavity: Normal Masses: Normal    Pelvic findings:    Right Adnexa: Normal   Left Adnexa: Normal   Bladder:  Normal         Cul - de - sac fluid: None    Ovarian follicles:   Right ovary:  3.2 x 3.2 x 2.1cm.     0 follicles     Left ovary:  1.9 x 2.3 x 1.5cm.     0 follicles    Comments:  Normal Pelvic Ultrasound.  Appropriate endometrial stripe 1 month s/p D&C for RPOC.    DAGO Cleveland MD Rebecca Blanco

## 2017-04-26 NOTE — PROGRESS NOTES
36 year old female presents for gynecologic ultrasound indicated by S/P dilation and curettage.  This study was done transvaginally.    Uterine findings:   Presence: Visible Size: Normal 3.5 x 4.9 x 4.3 cm.  Endometrium = 8.0 mm.   Cx length = 19.8 mm.      Flexion:  Anteverted Position: Midline Margins: Smooth Shape: normal   Contour: Irregular Texture: Homogeneous Cavity: Normal Masses: Normal    Pelvic findings:    Right Adnexa: Normal   Left Adnexa: Normal   Bladder:  Normal         Cul - de - sac fluid: None    Ovarian follicles:   Right ovary:  3.2 x 3.2 x 2.1cm.     0 follicles     Left ovary:  1.9 x 2.3 x 1.5cm.     0 follicles    Comments:  Normal Pelvic Ultrasound.  Appropriate endometrial stripe 1 month s/p D&C for RPOC.    DAGO Cleveland MD

## 2017-04-26 NOTE — MR AVS SNAPSHOT
After Visit Summary   2017    Franny Muro    MRN: [id number]           Patient Information     Date Of Birth          1980        Visit Information        Provider Department      2017 9:15 AM Elva Somers; [facility] ULTRASOUND  Services Department        Today's Diagnoses     S/P dilation and curettage           Follow-ups after your visit        Who to contact     Please call your clinic at [phone] to:    Ask questions about your health    Make or cancel appointments    Discuss your medicines    Learn about your test results    Speak to your doctor   If you have compliments or concerns about an experience at your clinic, or if you wish to file a complaint, please contact [facility] Patient Relations at [phone] or email us at [email or web address]         Additional Information About Your Visit        MyChart Information     [employer] is an electronic gateway that provides easy, online access to your medical records. With [employer], you can request a clinic appointment, read your test results, renew a prescription or communicate with your care team.     To sign up for [employer] visit the website at [email or web address]   You will be asked to enter the access code listed below, as well as some personal information. Please follow the directions to create your username and password.     Your access code is: [email or web address]  Expires: 2017  2:39 PM     Your access code will  in 90 days. If you need help or a new code, please contact your [facility] or call [phone] for assistance.        Care EveryWhere ID     This is your Care EveryWhere ID. This could be used by other organizations to access your [facility] medical records  [id number]         Blood Pressure from Last 3 Encounters:   17 129/81   03/15/17 119/74   17 115/63    Weight from Last 3 Encounters:    03/29/17 59 kg (130 lb)   03/15/17 59.3 kg (130 lb 11.7 oz)   03/01/17 59.6 kg (131 lb 8 oz)              We Performed the Following     US GYN Complete Transvaginal - 585[address] (In Clinic)        Primary Care Provider Office Phone # Fax #    [facility] [phone]       [address] [place] [address]        Thank you!     Thank you for choosing [facility]  for your care. Our goal is always to provide you with excellent care. Hearing back from our patients is one way we can continue to improve our services. Please take a few minutes to complete the written survey that you may receive in the mail after your visit with us. Thank you!             Your Updated Medication List - Protect others around you: Learn how to safely use, store and throw away your medicines at www.disposemymeds.org.          This list is accurate as of: 4/26/17 11:59 PM.  Always use your most recent med list.                   Brand Name Dispense Instructions for use    prenatal multivitamin  plus iron 27-0.8 MG Tabs per tablet      Take 1 tablet by mouth daily       vitamin D 2000 UNITS tablet      Take 2,000 Units by mouth

## (undated) DEVICE — SYR 10ML FINGER CONTROL W/O NDL 309695

## (undated) DEVICE — SEAL SET MYOSURE ROD LENS SCOPE SINGLE USE 40-902

## (undated) DEVICE — PAD CHUX UNDERPAD 30X30"

## (undated) DEVICE — SOL NACL 0.9% IRRIG 3000ML BAG 2B7477

## (undated) DEVICE — SPECIMEN BAG BEMIS HI FLOW SUCTION WHITE SOCK 533810

## (undated) DEVICE — GLOVE PROTEXIS BLUE W/NEU-THERA 7.0  2D73EB70

## (undated) DEVICE — NDL SPINAL 22GA 3.5" QUINCKE 405181

## (undated) DEVICE — SOL NACL 0.9% IRRIG 1000ML BOTTLE 2F7124

## (undated) DEVICE — JELLY LUBRICATING SURGILUBE 2OZ TUBE

## (undated) DEVICE — TUBING SYS AQUILEX BLUE INFLOW AQL-110 YLW OUTFLOW AQL-111

## (undated) DEVICE — LINEN TOWEL PACK X5 5464

## (undated) DEVICE — DEVICE TISSUE REMOVAL HYSTEROSCOPIC MYOSURE LITE 30-401LITE

## (undated) DEVICE — LINEN GOWN X4 5410

## (undated) DEVICE — Device

## (undated) DEVICE — GLOVE PROTEXIS W/NEU-THERA 6.5  2D73TE65

## (undated) RX ORDER — ONDANSETRON 2 MG/ML
INJECTION INTRAMUSCULAR; INTRAVENOUS
Status: DISPENSED
Start: 2017-03-15